# Patient Record
Sex: FEMALE | Race: WHITE | NOT HISPANIC OR LATINO | Employment: OTHER | URBAN - METROPOLITAN AREA
[De-identification: names, ages, dates, MRNs, and addresses within clinical notes are randomized per-mention and may not be internally consistent; named-entity substitution may affect disease eponyms.]

---

## 2017-12-03 ENCOUNTER — HOSPITAL ENCOUNTER (EMERGENCY)
Facility: OTHER | Age: 57
Discharge: HOME | End: 2017-12-03
Attending: EMERGENCY MEDICINE
Payer: MEDICARE

## 2017-12-03 VITALS
DIASTOLIC BLOOD PRESSURE: 88 MMHG | SYSTOLIC BLOOD PRESSURE: 141 MMHG | HEIGHT: 61 IN | RESPIRATION RATE: 16 BRPM | WEIGHT: 183.19 LBS | BODY MASS INDEX: 34.58 KG/M2 | OXYGEN SATURATION: 97 % | HEART RATE: 96 BPM | TEMPERATURE: 99 F

## 2017-12-03 DIAGNOSIS — R09.81 NASAL CONGESTION: Primary | ICD-10-CM

## 2017-12-03 PROCEDURE — 99283 EMERGENCY DEPT VISIT LOW MDM: CPT

## 2017-12-03 RX ORDER — PREDNISONE 10 MG/1
10 TABLET ORAL DAILY
Qty: 5 TABLET | Refills: 0 | Status: SHIPPED | OUTPATIENT
Start: 2017-12-03 | End: 2017-12-08

## 2017-12-03 NOTE — ED PROVIDER NOTES
"Encounter Date: 12/3/2017       History     Chief Complaint   Patient presents with    Sore Throat     pt states," Sore throat and cough since yesterday."    Cough     The history is provided by the patient.   Sore Throat   This is a new problem. The current episode started 2 days ago. The problem occurs constantly. The problem has not changed since onset.Pertinent negatives include no chest pain, no abdominal pain, no headaches and no shortness of breath. Nothing aggravates the symptoms. She has tried nothing for the symptoms.   Cough   Associated symptoms include sore throat. Pertinent negatives include no chest pain, no headaches and no shortness of breath.     Review of patient's allergies indicates:  No Known Allergies  No past medical history on file.  No past surgical history on file.  No family history on file.  Social History   Substance Use Topics    Smoking status: Never Smoker    Smokeless tobacco: Not on file    Alcohol use Not on file     Review of Systems   Constitutional: Negative.    HENT: Positive for sore throat.    Eyes: Negative.    Respiratory: Positive for cough. Negative for shortness of breath.    Cardiovascular: Negative.  Negative for chest pain.   Gastrointestinal: Negative.  Negative for abdominal pain.   Endocrine: Negative.    Genitourinary: Negative.    Musculoskeletal: Negative.    Skin: Negative.    Allergic/Immunologic: Negative.    Neurological: Negative.  Negative for headaches.   Hematological: Negative.    Psychiatric/Behavioral: Negative.    All other systems reviewed and are negative.      Physical Exam     Initial Vitals [12/03/17 1223]   BP Pulse Resp Temp SpO2   (!) 190/111 96 16 99.1 °F (37.3 °C) 97 %      MAP       137.33         Physical Exam    Nursing note and vitals reviewed.  Constitutional: Vital signs are normal. She appears well-developed. She is active and cooperative.   HENT:   Head: Normocephalic and atraumatic.   Right Ear: Tympanic membrane normal. "   Left Ear: Tympanic membrane normal.   Nose: Mucosal edema and rhinorrhea present.   Mouth/Throat: Uvula is midline, oropharynx is clear and moist and mucous membranes are normal.   Eyes: Conjunctivae, EOM and lids are normal. Pupils are equal, round, and reactive to light.   Neck: Trachea normal and full passive range of motion without pain. Neck supple. No thyroid mass present.   Cardiovascular: Normal rate, regular rhythm, S1 normal, S2 normal, normal heart sounds, intact distal pulses and normal pulses.   Abdominal: Soft. Normal appearance, normal aorta and bowel sounds are normal.   Musculoskeletal: Normal range of motion.   Lymphadenopathy:     She has no axillary adenopathy.   Neurological: She is alert and oriented to person, place, and time.   Skin: Skin is warm, dry and intact.   Psychiatric: She has a normal mood and affect. Her speech is normal and behavior is normal. Judgment and thought content normal. Cognition and memory are normal.         ED Course   Procedures  Labs Reviewed - No data to display                            ED Course      Clinical Impression:   The encounter diagnosis was Nasal congestion.                           Bharath Rivas MD  12/03/17 1122

## 2018-09-20 PROBLEM — R73.01 IMPAIRED FASTING BLOOD SUGAR: Status: ACTIVE | Noted: 2018-09-20

## 2018-09-20 PROBLEM — J20.9 ACUTE BRONCHITIS: Status: ACTIVE | Noted: 2018-09-20

## 2018-09-20 PROBLEM — Z12.39 BREAST CANCER SCREENING, HIGH RISK PATIENT: Status: ACTIVE | Noted: 2018-09-20

## 2018-09-20 PROBLEM — R19.7 DIARRHEA: Status: ACTIVE | Noted: 2018-09-20

## 2018-09-20 PROBLEM — Z91.89 AT HIGH RISK FOR BREAST CANCER: Status: ACTIVE | Noted: 2018-09-20

## 2018-09-20 PROBLEM — E66.3 OVERWEIGHT (BMI 25.0-29.9): Status: ACTIVE | Noted: 2018-09-20

## 2018-09-20 PROBLEM — Z91.89 COLON CANCER HIGH RISK: Status: ACTIVE | Noted: 2018-09-20

## 2018-09-20 PROBLEM — R05.9 COUGH: Status: ACTIVE | Noted: 2018-09-20

## 2018-09-27 ENCOUNTER — HOSPITAL ENCOUNTER (OUTPATIENT)
Dept: RADIOLOGY | Facility: HOSPITAL | Age: 58
Discharge: HOME | End: 2018-09-27
Attending: INTERNAL MEDICINE
Payer: MEDICARE

## 2018-09-27 VITALS — WEIGHT: 185 LBS | HEIGHT: 66 IN | BODY MASS INDEX: 29.73 KG/M2

## 2018-09-27 DIAGNOSIS — Z12.39 BREAST CANCER SCREENING, HIGH RISK PATIENT: ICD-10-CM

## 2018-09-27 PROCEDURE — 77067 SCR MAMMO BI INCL CAD: CPT | Mod: TC

## 2018-09-27 PROCEDURE — 77067 SCR MAMMO BI INCL CAD: CPT | Mod: 26,,, | Performed by: RADIOLOGY

## 2018-09-27 PROCEDURE — 77063 BREAST TOMOSYNTHESIS BI: CPT | Mod: 26,,, | Performed by: RADIOLOGY

## 2021-03-26 ENCOUNTER — HOSPITAL ENCOUNTER (OUTPATIENT)
Dept: RADIOLOGY | Facility: HOSPITAL | Age: 61
Discharge: HOME | End: 2021-03-26
Attending: INTERNAL MEDICINE
Payer: MEDICARE

## 2021-03-26 DIAGNOSIS — Z12.31 ENCOUNTER FOR MAMMOGRAM TO ESTABLISH BASELINE MAMMOGRAM: ICD-10-CM

## 2021-03-26 PROCEDURE — 77067 SCR MAMMO BI INCL CAD: CPT | Mod: 26,,, | Performed by: RADIOLOGY

## 2021-03-26 PROCEDURE — 77067 SCR MAMMO BI INCL CAD: CPT | Mod: TC

## 2021-03-26 PROCEDURE — 77067 MAMMO DIGITAL SCREENING BILAT: ICD-10-PCS | Mod: 26,,, | Performed by: RADIOLOGY

## 2022-03-31 ENCOUNTER — LAB VISIT (OUTPATIENT)
Dept: LAB | Facility: HOSPITAL | Age: 62
End: 2022-03-31
Attending: INTERNAL MEDICINE
Payer: MEDICARE

## 2022-03-31 ENCOUNTER — OFFICE VISIT (OUTPATIENT)
Dept: FAMILY MEDICINE | Facility: CLINIC | Age: 62
End: 2022-03-31
Payer: MEDICARE

## 2022-03-31 VITALS
WEIGHT: 185.19 LBS | BODY MASS INDEX: 29.76 KG/M2 | HEIGHT: 66 IN | SYSTOLIC BLOOD PRESSURE: 126 MMHG | OXYGEN SATURATION: 99 % | TEMPERATURE: 98 F | HEART RATE: 77 BPM | DIASTOLIC BLOOD PRESSURE: 76 MMHG

## 2022-03-31 DIAGNOSIS — Z00.00 PREVENTATIVE HEALTH CARE: ICD-10-CM

## 2022-03-31 DIAGNOSIS — Z00.00 PREVENTATIVE HEALTH CARE: Primary | ICD-10-CM

## 2022-03-31 DIAGNOSIS — G40.909 SEIZURE DISORDER: ICD-10-CM

## 2022-03-31 DIAGNOSIS — Z12.31 ENCOUNTER FOR SCREENING MAMMOGRAM FOR MALIGNANT NEOPLASM OF BREAST: ICD-10-CM

## 2022-03-31 DIAGNOSIS — Z11.59 NEED FOR HEPATITIS C SCREENING TEST: ICD-10-CM

## 2022-03-31 DIAGNOSIS — F79 INTELLECTUAL DISABILITY: ICD-10-CM

## 2022-03-31 LAB
ALBUMIN SERPL BCP-MCNC: 4.1 G/DL (ref 3.5–5.2)
ALP SERPL-CCNC: 126 U/L (ref 55–135)
ALT SERPL W/O P-5'-P-CCNC: 13 U/L (ref 10–44)
ANION GAP SERPL CALC-SCNC: 9 MMOL/L (ref 8–16)
AST SERPL-CCNC: 17 U/L (ref 10–40)
BASOPHILS # BLD AUTO: 0.05 K/UL (ref 0–0.2)
BASOPHILS NFR BLD: 0.6 % (ref 0–1.9)
BILIRUB SERPL-MCNC: 0.2 MG/DL (ref 0.1–1)
BUN SERPL-MCNC: 13 MG/DL (ref 8–23)
CALCIUM SERPL-MCNC: 9.3 MG/DL (ref 8.7–10.5)
CHLORIDE SERPL-SCNC: 103 MMOL/L (ref 95–110)
CO2 SERPL-SCNC: 29 MMOL/L (ref 23–29)
CREAT SERPL-MCNC: 0.7 MG/DL (ref 0.5–1.4)
DIFFERENTIAL METHOD: ABNORMAL
EOSINOPHIL # BLD AUTO: 0.2 K/UL (ref 0–0.5)
EOSINOPHIL NFR BLD: 2.5 % (ref 0–8)
ERYTHROCYTE [DISTWIDTH] IN BLOOD BY AUTOMATED COUNT: 12 % (ref 11.5–14.5)
EST. GFR  (AFRICAN AMERICAN): >60 ML/MIN/1.73 M^2
EST. GFR  (NON AFRICAN AMERICAN): >60 ML/MIN/1.73 M^2
GLUCOSE SERPL-MCNC: 86 MG/DL (ref 70–110)
HCT VFR BLD AUTO: 41 % (ref 37–48.5)
HGB BLD-MCNC: 13.4 G/DL (ref 12–16)
IMM GRANULOCYTES # BLD AUTO: 0.02 K/UL (ref 0–0.04)
IMM GRANULOCYTES NFR BLD AUTO: 0.2 % (ref 0–0.5)
LYMPHOCYTES # BLD AUTO: 3 K/UL (ref 1–4.8)
LYMPHOCYTES NFR BLD: 35.7 % (ref 18–48)
MCH RBC QN AUTO: 31.2 PG (ref 27–31)
MCHC RBC AUTO-ENTMCNC: 32.7 G/DL (ref 32–36)
MCV RBC AUTO: 95 FL (ref 82–98)
MONOCYTES # BLD AUTO: 1 K/UL (ref 0.3–1)
MONOCYTES NFR BLD: 11.7 % (ref 4–15)
NEUTROPHILS # BLD AUTO: 4.1 K/UL (ref 1.8–7.7)
NEUTROPHILS NFR BLD: 49.3 % (ref 38–73)
NRBC BLD-RTO: 0 /100 WBC
PLATELET # BLD AUTO: 280 K/UL (ref 150–450)
PMV BLD AUTO: 9.3 FL (ref 9.2–12.9)
POTASSIUM SERPL-SCNC: 4.4 MMOL/L (ref 3.5–5.1)
PROT SERPL-MCNC: 8 G/DL (ref 6–8.4)
RBC # BLD AUTO: 4.3 M/UL (ref 4–5.4)
SODIUM SERPL-SCNC: 141 MMOL/L (ref 136–145)
WBC # BLD AUTO: 8.4 K/UL (ref 3.9–12.7)

## 2022-03-31 PROCEDURE — 86803 HEPATITIS C AB TEST: CPT | Performed by: INTERNAL MEDICINE

## 2022-03-31 PROCEDURE — 99999 PR PBB SHADOW E&M-EST. PATIENT-LVL III: ICD-10-PCS | Mod: PBBFAC,,, | Performed by: INTERNAL MEDICINE

## 2022-03-31 PROCEDURE — 85025 COMPLETE CBC W/AUTO DIFF WBC: CPT | Performed by: INTERNAL MEDICINE

## 2022-03-31 PROCEDURE — 99204 OFFICE O/P NEW MOD 45 MIN: CPT | Mod: S$PBB,,, | Performed by: INTERNAL MEDICINE

## 2022-03-31 PROCEDURE — 99999 PR PBB SHADOW E&M-EST. PATIENT-LVL III: CPT | Mod: PBBFAC,,, | Performed by: INTERNAL MEDICINE

## 2022-03-31 PROCEDURE — 80053 COMPREHEN METABOLIC PANEL: CPT | Performed by: INTERNAL MEDICINE

## 2022-03-31 PROCEDURE — 36415 COLL VENOUS BLD VENIPUNCTURE: CPT | Performed by: INTERNAL MEDICINE

## 2022-03-31 PROCEDURE — 99204 PR OFFICE/OUTPT VISIT, NEW, LEVL IV, 45-59 MIN: ICD-10-PCS | Mod: S$PBB,,, | Performed by: INTERNAL MEDICINE

## 2022-03-31 PROCEDURE — 99213 OFFICE O/P EST LOW 20 MIN: CPT | Mod: PBBFAC,PN | Performed by: INTERNAL MEDICINE

## 2022-03-31 RX ORDER — CARBAMAZEPINE 200 MG/1
200 TABLET ORAL 3 TIMES DAILY
COMMUNITY
Start: 2022-02-10 | End: 2022-03-31 | Stop reason: SDUPTHER

## 2022-03-31 NOTE — PROGRESS NOTES
"Subjective:       Patient ID: Smita Coughlin is a 62 y.o. female.    Chief Complaint: Establish Care (New patient)    Est pcp    HPI: 61 y/o with mental retardation seizure disorder presents with her mother to establish primary care. Patient with limited verbal ability but able to follow up command. Her mother helps with all ADL's she is able to dress and feed herself. Mother assists with her antiseizure medication. She is under care of neurologist, Dr. Lawrence, for her seizures. Mother tells me taht patient has had a seizure in more than two years. Her seizures are typically manifested by stumbling or difficulty hold objects in her hands. 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 Mother reports she sleeps well does not note her being in any pain or discomfort    Review of Systems    Objective:     Vitals:    03/31/22 1451   BP: 126/76   BP Location: Right arm   Patient Position: Sitting   BP Method: Medium (Manual)   Pulse: 77   Temp: 98.2 °F (36.8 °C)   TempSrc: Oral   SpO2: 99%   Weight: 84 kg (185 lb 3 oz)   Height: 5' 6" (1.676 m)          Physical Exam  Constitutional:       Appearance: She is obese.      Comments: She is cooperative with exam    HENT:      Head: Normocephalic and atraumatic.   Eyes:      General: No scleral icterus.  Cardiovascular:      Rate and Rhythm: Normal rate and regular rhythm.      Pulses: Normal pulses.      Heart sounds: No murmur heard.    No friction rub. No gallop.   Pulmonary:      Effort: Pulmonary effort is normal. No respiratory distress.      Breath sounds: No wheezing.   Abdominal:      General: There is no distension.      Palpations: Abdomen is soft.   Musculoskeletal:      Right lower leg: No edema.      Left lower leg: No edema.   Skin:     General: Skin is warm and dry.   Neurological:      Mental Status: She is alert.         Assessment and Plan   1. Preventative health care  Taking medication with supervision of her " mother   Wear seatbelts  Daily activity  - CBC Auto Differential; Future  - Comprehensive Metabolic Panel; Future    2. Seizure disorder  Check blood count and lfts for adverse effects medicaiton management per her neurologist  - CBC Auto Differential; Future  - Comprehensive Metabolic Panel; Future    3. Mental retardation  Continue social support    4. Need for hepatitis C screening test  Hep c ab today  - Hepatitis C Antibody; Future    5. Encounter for screening mammogram for malignant neoplasm of breast  mammogram  - Mammo Digital Screening Bilat w/ Darvin; Future

## 2022-04-01 LAB — HCV AB SERPL QL IA: NEGATIVE

## 2022-04-07 DIAGNOSIS — Z12.11 COLON CANCER SCREENING: ICD-10-CM

## 2022-05-13 ENCOUNTER — HOSPITAL ENCOUNTER (OUTPATIENT)
Dept: RADIOLOGY | Facility: HOSPITAL | Age: 62
Discharge: HOME | End: 2022-05-13
Attending: INTERNAL MEDICINE
Payer: MEDICARE

## 2022-05-13 VITALS — BODY MASS INDEX: 29.76 KG/M2 | WEIGHT: 185.19 LBS | HEIGHT: 66 IN

## 2022-05-13 DIAGNOSIS — Z12.31 ENCOUNTER FOR SCREENING MAMMOGRAM FOR MALIGNANT NEOPLASM OF BREAST: ICD-10-CM

## 2022-05-13 PROCEDURE — 77063 MAMMO DIGITAL SCREENING BILAT WITH TOMO: ICD-10-PCS | Mod: 26,,, | Performed by: RADIOLOGY

## 2022-05-13 PROCEDURE — 77067 SCR MAMMO BI INCL CAD: CPT | Mod: 26,,, | Performed by: RADIOLOGY

## 2022-05-13 PROCEDURE — 77063 BREAST TOMOSYNTHESIS BI: CPT | Mod: 26,,, | Performed by: RADIOLOGY

## 2022-05-13 PROCEDURE — 77063 BREAST TOMOSYNTHESIS BI: CPT | Mod: TC

## 2022-05-13 PROCEDURE — 77067 MAMMO DIGITAL SCREENING BILAT WITH TOMO: ICD-10-PCS | Mod: 26,,, | Performed by: RADIOLOGY

## 2022-08-16 ENCOUNTER — TELEPHONE (OUTPATIENT)
Dept: FAMILY MEDICINE | Facility: CLINIC | Age: 62
End: 2022-08-16
Payer: MEDICARE

## 2022-08-16 NOTE — TELEPHONE ENCOUNTER
----- Message from Ernesto Jacobsen MD sent at 2022  1:48 PM CDT -----  RegardinL form  Please contact patient's mother to  completed 90L form in my outbox, please make copy to scan to patient's medical record as well. Thank you

## 2023-05-19 ENCOUNTER — HOSPITAL ENCOUNTER (EMERGENCY)
Facility: HOSPITAL | Age: 63
Discharge: HOME | End: 2023-05-20
Attending: STUDENT IN AN ORGANIZED HEALTH CARE EDUCATION/TRAINING PROGRAM
Payer: MEDICARE
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
## 2023-05-19 DIAGNOSIS — L03.119 CELLULITIS OF HAND: Primary | ICD-10-CM

## 2023-05-19 PROCEDURE — 25000003 PHARM REV CODE 250: Performed by: STUDENT IN AN ORGANIZED HEALTH CARE EDUCATION/TRAINING PROGRAM

## 2023-05-19 PROCEDURE — 99284 EMERGENCY DEPT VISIT MOD MDM: CPT

## 2023-05-19 RX ORDER — ACETAMINOPHEN 500 MG
1000 TABLET ORAL
Status: COMPLETED | OUTPATIENT
Start: 2023-05-19 | End: 2023-05-19

## 2023-05-19 RX ORDER — AMOXICILLIN AND CLAVULANATE POTASSIUM 875; 125 MG/1; MG/1
1 TABLET, FILM COATED ORAL
Status: COMPLETED | OUTPATIENT
Start: 2023-05-19 | End: 2023-05-19

## 2023-05-19 RX ORDER — IBUPROFEN 600 MG/1
600 TABLET ORAL
Status: COMPLETED | OUTPATIENT
Start: 2023-05-19 | End: 2023-05-19

## 2023-05-19 RX ADMIN — TETANUS TOXOID, REDUCED DIPHTHERIA TOXOID AND ACELLULAR PERTUSSIS VACCINE, ADSORBED 0.5 ML: 5; 2.5; 8; 8; 2.5 SUSPENSION INTRAMUSCULAR at 11:05

## 2023-05-19 RX ADMIN — IBUPROFEN 600 MG: 600 TABLET ORAL at 11:05

## 2023-05-19 RX ADMIN — AMOXICILLIN AND CLAVULANATE POTASSIUM 1 TABLET: 875; 125 TABLET, FILM COATED ORAL at 11:05

## 2023-05-19 RX ADMIN — ACETAMINOPHEN 1000 MG: 500 TABLET ORAL at 11:05

## 2023-05-20 VITALS
OXYGEN SATURATION: 96 % | HEART RATE: 69 BPM | SYSTOLIC BLOOD PRESSURE: 128 MMHG | TEMPERATURE: 98 F | RESPIRATION RATE: 16 BRPM | DIASTOLIC BLOOD PRESSURE: 74 MMHG | WEIGHT: 185 LBS | BODY MASS INDEX: 29.86 KG/M2

## 2023-05-20 PROCEDURE — 90471 IMMUNIZATION ADMIN: CPT | Performed by: STUDENT IN AN ORGANIZED HEALTH CARE EDUCATION/TRAINING PROGRAM

## 2023-05-20 PROCEDURE — 63600175 PHARM REV CODE 636 W HCPCS: Performed by: STUDENT IN AN ORGANIZED HEALTH CARE EDUCATION/TRAINING PROGRAM

## 2023-05-20 PROCEDURE — 90715 TDAP VACCINE 7 YRS/> IM: CPT | Performed by: STUDENT IN AN ORGANIZED HEALTH CARE EDUCATION/TRAINING PROGRAM

## 2023-05-20 RX ORDER — AMOXICILLIN AND CLAVULANATE POTASSIUM 875; 125 MG/1; MG/1
1 TABLET, FILM COATED ORAL 2 TIMES DAILY
Qty: 20 TABLET | Refills: 0 | Status: SHIPPED | OUTPATIENT
Start: 2023-05-20 | End: 2023-05-30

## 2023-05-20 RX ORDER — NAPROXEN 500 MG/1
500 TABLET ORAL 2 TIMES DAILY WITH MEALS
Qty: 60 TABLET | Refills: 0 | Status: SHIPPED | OUTPATIENT
Start: 2023-05-20

## 2023-05-20 RX ORDER — NAPROXEN 500 MG/1
500 TABLET ORAL 2 TIMES DAILY WITH MEALS
Qty: 60 TABLET | Refills: 0 | Status: SHIPPED | OUTPATIENT
Start: 2023-05-20 | End: 2023-05-20 | Stop reason: SDUPTHER

## 2023-05-20 RX ORDER — AMOXICILLIN AND CLAVULANATE POTASSIUM 875; 125 MG/1; MG/1
1 TABLET, FILM COATED ORAL 2 TIMES DAILY
Qty: 20 TABLET | Refills: 0 | Status: SHIPPED | OUTPATIENT
Start: 2023-05-20 | End: 2023-05-20 | Stop reason: SDUPTHER

## 2023-05-20 NOTE — DISCHARGE INSTRUCTIONS
Your hand needs to be re-evaluated on Monday.  You can follow-up with your primary care doctor.  If you are unable to get an appointment please return to the ER for re-evaluation.  Take medication as prescribed.  I have also referred you to hand surgery          Thank you for coming to our Emergency Department today. It is important to remember that some problems or medical conditions are difficult to diagnose and may not be found or addressed during your Emergency Department visit.  These conditions often start with non-specific symptoms and can only be diagnosed on follow up visits with your primary care physician or specialist when the symptoms continue or change. Please remember that all medical conditions can change, and we cannot predict how you will be feeling tomorrow or the next day.    Return to the ER with any questions/concerns, new/concerning symptoms, worsening, failure to improve or inability to obtain follow-up.       Be sure to follow up with your primary care doctor and review all labs/imaging/tests that were performed during your ER visit with them. It is very common for us to identify non-emergent incidental findings which must be followed up with your primary care physician.  Some labs/imaging/tests may be outside of the normal range, and require non-emergent follow-up and/or further investigation/treatment/procedures/testing to help diagnose/exclude/prevent complications or other potentially serious medical conditions. Some abnormalities may not have been discussed or addressed during your ER visit. Some lab results may not return during your ER visit but can be accessible by downloading the free [facility] bereket or by visiting [email or web address] . It is important for you to review all labs/imaging/tests which are outside of the normal range with your physician.    An ER visit does not replace a primary care visit, and many screening tests or follow-up tests cannot be ordered by an ER  doctor or performed by the ER. Some tests may even require pre-approval.    If you do not have a primary care doctor, you may contact the one listed on your discharge paperwork or you may also call the [facility] Appointment Desk at [phone] , or [address] [place] [address] at  [phone] to schedule an appointment, or establish care with a primary care doctor or even a specialist and to obtain information about local resources. It is important to your health that you have a primary care doctor.    Please take all medications as directed. We have done our best to select a medication for you that will treat your condition however, all medications may potentially have side-effects and it is impossible to predict which medications may give you side-effects or what those side-effects (if any) those medications may give you.  If you feel that you are having a negative effect or side-effect of any medication you should stop taking those medications immediately and seek medical attention. If you feel that you are having a life-threatening reaction call 911.        Do not drive, swim, climb to height, take a bath, operate heavy machinery, drink alcohol or take potentially sedating medications, sign any legal documents or make any important decisions for 24 hours if you have received any pain medications, sedatives or mood altering drugs during your ER visit or within 24 hours of taking them if they have been prescribed to you.     You can find additional resources for Dentists, hearing aids, durable medical equipment, low cost pharmacies and other resources at [email or web address]

## 2023-05-20 NOTE — ED PROVIDER NOTES
Encounter Date: 5/19/2023    SCRIBE #1 NOTE: I, Mckenzie Rasheed, am scribing for, and in the presence of,  Jose Guadalupe Holm MD. I have scribed the entire note.     History     Chief Complaint   Patient presents with    Arm Swelling     Pt presents to the ED with c/o left arm swelling after being bitten by family dog last night. Denies fever/chills or pain     Time patient was seen by the provider: 11:34 PM      The patient is a 63 y.o. female with past medical history of seizure disorder, intellectual disability who presents to the ED with a complaint of injury to the left arm due to a dog bite sustained 1 day ago. Additional history obtained by independent historian: Mother. The patient's mother reports that the patient was bitten by their pineda retriever last night, but it only bled a little and was not swollen. She noticed the swelling of the patient's arm tonight when she grabbed her arm. The patient's mother states she applied neosporin and ice to the left wrist to alleviate the symptoms. Associated symptoms include pain and swelling of the left hand and wrist. No other exacerbating or alleviating factors. Patient denies any other associated symptoms.     The history is provided by the patient and a parent. The history is limited by a developmental delay. No  was used.   Review of patient's allergies indicates:  No Known Allergies  Past Medical History:   Diagnosis Date    Anxiety 12/8/2012    Mental retardation 12/8/2012    Seizure disorder 12/8/2012     Past Surgical History:   Procedure Laterality Date    lap bela       Family History   Problem Relation Age of Onset    No Known Problems Mother         Afib    No Known Problems Father         CAD    No Known Problems Brother      Social History     Tobacco Use    Smoking status: Never    Smokeless tobacco: Never     Review of Systems   Constitutional:  Negative for chills and fever.   HENT:  Negative for facial swelling and sore  throat.    Eyes:  Negative for visual disturbance.   Respiratory:  Negative for cough and shortness of breath.    Cardiovascular:  Negative for chest pain and palpitations.   Gastrointestinal:  Negative for abdominal pain, nausea and vomiting.   Genitourinary:  Negative for dysuria and hematuria.   Musculoskeletal:  Positive for arthralgias, joint swelling (wrist) and myalgias. Negative for back pain.   Skin:  Positive for wound. Negative for rash.   Neurological:  Negative for weakness and headaches.   Hematological:  Does not bruise/bleed easily.   Psychiatric/Behavioral: Negative.       Physical Exam     Initial Vitals [05/19/23 2229]   BP Pulse Resp Temp SpO2   (!) 241/121 89 16 97.6 °F (36.4 °C) 99 %      MAP       --         Physical Exam    Nursing note and vitals reviewed.  Constitutional: She appears well-developed and well-nourished. She is not diaphoretic. No distress.   HENT:   Head: Normocephalic and atraumatic.   Right Ear: External ear normal.   Left Ear: External ear normal.   Nose: Nose normal.   Eyes: Conjunctivae are normal. No scleral icterus.   Neck: Neck supple. No tracheal deviation present.   Normal range of motion.  Cardiovascular:  Normal rate, regular rhythm and normal heart sounds.           Pulses:       Radial pulses are 2+ on the right side and 2+ on the left side.   Pulmonary/Chest: Breath sounds normal. No respiratory distress.   Abdominal: Abdomen is soft. Bowel sounds are normal. There is no abdominal tenderness.   Musculoskeletal:      Cervical back: Normal range of motion and neck supple.      Comments: Small puncture wound that is scabbed over with erythema and edema to ventral aspect of left wrist.   Erythema and swelling to the dorsal left palm.  Able to make a fist.        Neurological: She is alert and oriented to person, place, and time.   Intellectual disability.    Skin: Skin is warm and dry.   Psychiatric: She has a normal mood and affect. Thought content normal.        ED Course   Procedures  Labs Reviewed - No data to display       Imaging Results              X-Ray Hand 2 View Left (Final result)  Result time 05/20/23 00:02:47      Final result by Jonathan Duenas MD (05/20/23 00:02:47)                   Impression:      No acute osseous abnormality identified.      Electronically signed by: Jonathan Duenas MD  Date:    05/20/2023  Time:    00:02               Narrative:    EXAMINATION:  XR WRIST COMPLETE 3 VIEWS LEFT; XR HAND 2 VIEW LEFT    CLINICAL HISTORY:  Bitten by dog, initial encounter    TECHNIQUE:  PA, lateral, and oblique views of the left wrist were performed.  Left hand three views.    COMPARISON:  None    FINDINGS:  No evidence of acute displaced fracture, dislocation, or osseous destructive process.  Monitoring device is visualized over the index finger which partially obscures evaluation.  Soft tissue swelling is seen involving the wrist and over the dorsal aspect of the hand.  No radiopaque retained foreign body seen.  There is mild negative ulnar variance.                                       X-Ray Wrist Complete Left (Final result)  Result time 05/20/23 00:02:47      Final result by Jonathan Duenas MD (05/20/23 00:02:47)                   Impression:      No acute osseous abnormality identified.      Electronically signed by: Jonathan Duenas MD  Date:    05/20/2023  Time:    00:02               Narrative:    EXAMINATION:  XR WRIST COMPLETE 3 VIEWS LEFT; XR HAND 2 VIEW LEFT    CLINICAL HISTORY:  Bitten by dog, initial encounter    TECHNIQUE:  PA, lateral, and oblique views of the left wrist were performed.  Left hand three views.    COMPARISON:  None    FINDINGS:  No evidence of acute displaced fracture, dislocation, or osseous destructive process.  Monitoring device is visualized over the index finger which partially obscures evaluation.  Soft tissue swelling is seen involving the wrist and over the dorsal aspect of the hand.  No radiopaque retained  foreign body seen.  There is mild negative ulnar variance.                                       Medications   Tdap (BOOSTRIX) vaccine injection 0.5 mL (0.5 mLs Intramuscular Given 5/19/23 2332)   ibuprofen tablet 600 mg (600 mg Oral Given 5/19/23 2332)   acetaminophen tablet 1,000 mg (1,000 mg Oral Given 5/19/23 2332)   amoxicillin-clavulanate 875-125mg per tablet 1 tablet (1 tablet Oral Given 5/19/23 2332)     Medical Decision Making:   History:   Old Medical Records: I decided to obtain old medical records.  Clinical Tests:   Radiological Study: Ordered and Reviewed        Scribe Attestation:   Scribe #1: I performed the above scribed service and the documentation accurately describes the services I performed. I attest to the accuracy of the note.    Attending Attestation:           Physician Attestation for Scribe:  Physician Attestation Statement for Scribe #1: I, reviewed documentation, as scribed by Mckenzie Rasheed in my presence, and it is both accurate and complete.           ED Course as of 05/20/23 0304   Sat May 20, 2023   0024 X-Ray Hand 2 View Left  Impression:     No acute osseous abnormality identified. [CC]   0024 X-Ray Wrist Complete Left  Impression:     No acute osseous abnormality identified. [CC]   0259 Patient presenting to the emergency department for evaluation of hand swelling after dog bite yesterday.  It was a pineda retriever.  The dog has had all the shots up-to-date.  I do not believe rabies immunization indicated at this time.  Concern for possible infection versus edema related to injury.  Patient without fever.  Vital signs are stable.  She otherwise looks well at bedside.  Plan to treat with Augmentin and have her re-evaluated in 2 days by PCP and or in the ER.  Patient does not have any high-risk past medical history including heart disease, peripheral arterial disease, diabetes.  I believe it is appropriate for outpatient treatment initially.  X-rays are negative without any  indication of gas formation.  No fluctuance noted on exam.  Doubt abscess.  No fractures noted on x-ray.  I reviewed all x-rays personally.  Strict return precautions given. I discussed with the patient/family the diagnosis, treatment plan, indications for return to the emergency department, and for expected follow-up. The patient/family verbalized an understanding. The patient/family is asked if there are any questions or concerns. We discuss the case, until all issues are addressed to the patient/family's satisfaction. Patient/family understands and is agreeable to the plan. Patient is stable and ready for discharge.      [CC]      ED Course User Index  [CC] Jose Guadalupe Holm MD                   Clinical Impression:   Final diagnoses:  [W54.0XXA] Dog bite  [L03.119] Cellulitis of hand (Primary)        ED Disposition Condition    Discharge Stable          ED Prescriptions       Medication Sig Dispense Start Date End Date Auth. Provider    naproxen (NAPROSYN) 500 MG tablet  (Status: Discontinued) Take 1 tablet (500 mg total) by mouth 2 (two) times daily with meals. 60 tablet 5/20/2023 5/20/2023 Jose Guadalupe Holm MD    amoxicillin-clavulanate 875-125mg (AUGMENTIN) 875-125 mg per tablet  (Status: Discontinued) Take 1 tablet by mouth 2 (two) times daily. for 10 days 20 tablet 5/20/2023 5/20/2023 Jose Guadalupe Holm MD    amoxicillin-clavulanate 875-125mg (AUGMENTIN) 875-125 mg per tablet Take 1 tablet by mouth 2 (two) times daily. for 10 days 20 tablet 5/20/2023 5/30/2023 Jose Guadalupe Holm MD    naproxen (NAPROSYN) 500 MG tablet Take 1 tablet (500 mg total) by mouth 2 (two) times daily with meals. 60 tablet 5/20/2023 -- Jose Guadalupe Holm MD          Follow-up Information       Follow up With Specialties Details Why Contact Info    Ernesto Jacobsen MD Internal Medicine, Wound Care Schedule an appointment as soon as possible for a visit in 2 days  [address] [facility]  [place] [address]  [phone]      [facility]  Emergency Dept Emergency Medicine Go to  If symptoms worsen [address]  [facility] [id number] [phone]             Jose Guadalupe Holm MD  05/20/23 6069

## 2023-05-20 NOTE — ED TRIAGE NOTES
Pt BIB mother with c/o bite injury yesterday at 1500. Pt presents with dog bite wound to L hand, swelling and redness noted. Pt denies pain, fever, or chills. Tegretol given PTA. Pt is intellectually delayed, NAD, no trauma or bleeding noted.

## 2023-05-22 ENCOUNTER — TELEPHONE (OUTPATIENT)
Dept: FAMILY MEDICINE | Facility: CLINIC | Age: 63
End: 2023-05-22
Payer: MEDICARE

## 2023-05-22 NOTE — TELEPHONE ENCOUNTER
----- Message from Brigida Villalpando sent at 5/22/2023  1:29 PM CDT -----  Type: Patient Call Back    Who called:Mrs. Coughlin/ Mother     What is the request in detail: Asking for a call regarding PT     Can the clinic reply by [facility]? No     Would the patient rather a call back or a response via [facility]? CALL     Best call back number: [phone]

## 2023-05-22 NOTE — TELEPHONE ENCOUNTER
Assisted patient's mother to get patient scheduled for an appt, advise that she refer to ER again if her hand swelling doesn't improve or if she develops a fever, she verbalized understanding.

## 2023-05-23 ENCOUNTER — TELEPHONE (OUTPATIENT)
Dept: FAMILY MEDICINE | Facility: CLINIC | Age: 63
End: 2023-05-23
Payer: MEDICARE

## 2023-05-23 ENCOUNTER — PES CALL (OUTPATIENT)
Dept: ADMINISTRATIVE | Facility: CLINIC | Age: 63
End: 2023-05-23
Payer: MEDICARE

## 2023-05-23 DIAGNOSIS — Z12.31 ENCOUNTER FOR SCREENING MAMMOGRAM FOR MALIGNANT NEOPLASM OF BREAST: Primary | ICD-10-CM

## 2023-05-23 NOTE — TELEPHONE ENCOUNTER
----- Message from Loly Lugo sent at 5/23/2023 10:02 AM CDT -----  Type:  Mammogram    Caller is requesting to schedule their annual mammogram appointment.  Order is not listed in EPIC.  Please enter order and contact patient to schedule.  Name of Caller: mother     Where would they like the mammogram performed? [facility] [place]    Would the patient rather a call back or a response via [facility]? call    Best Call Back Number: [phone] (home)

## 2023-05-30 ENCOUNTER — HOSPITAL ENCOUNTER (OUTPATIENT)
Dept: RADIOLOGY | Facility: HOSPITAL | Age: 63
Discharge: HOME | End: 2023-05-30
Attending: INTERNAL MEDICINE
Payer: MEDICARE

## 2023-05-30 ENCOUNTER — OFFICE VISIT (OUTPATIENT)
Dept: FAMILY MEDICINE | Facility: CLINIC | Age: 63
End: 2023-05-30
Payer: MEDICARE

## 2023-05-30 VITALS
TEMPERATURE: 98 F | HEIGHT: 66 IN | OXYGEN SATURATION: 98 % | DIASTOLIC BLOOD PRESSURE: 78 MMHG | WEIGHT: 179.69 LBS | SYSTOLIC BLOOD PRESSURE: 134 MMHG | HEART RATE: 64 BPM | BODY MASS INDEX: 28.88 KG/M2

## 2023-05-30 VITALS — HEIGHT: 66 IN | BODY MASS INDEX: 29.72 KG/M2 | WEIGHT: 184.94 LBS

## 2023-05-30 DIAGNOSIS — Z12.31 ENCOUNTER FOR SCREENING MAMMOGRAM FOR MALIGNANT NEOPLASM OF BREAST: ICD-10-CM

## 2023-05-30 DIAGNOSIS — G40.909 SEIZURE DISORDER: ICD-10-CM

## 2023-05-30 DIAGNOSIS — Z00.00 PREVENTATIVE HEALTH CARE: Primary | ICD-10-CM

## 2023-05-30 DIAGNOSIS — F79 INTELLECTUAL DISABILITY: ICD-10-CM

## 2023-05-30 PROCEDURE — 99213 OFFICE O/P EST LOW 20 MIN: CPT | Mod: S$PBB,,, | Performed by: INTERNAL MEDICINE

## 2023-05-30 PROCEDURE — 99999 PR PBB SHADOW E&M-EST. PATIENT-LVL III: CPT | Mod: PBBFAC,,, | Performed by: INTERNAL MEDICINE

## 2023-05-30 PROCEDURE — 99213 OFFICE O/P EST LOW 20 MIN: CPT | Mod: PBBFAC,PN | Performed by: INTERNAL MEDICINE

## 2023-05-30 PROCEDURE — 77067 SCR MAMMO BI INCL CAD: CPT | Mod: 26,,, | Performed by: RADIOLOGY

## 2023-05-30 PROCEDURE — 77063 MAMMO DIGITAL SCREENING BILAT WITH TOMO: ICD-10-PCS | Mod: 26,,, | Performed by: RADIOLOGY

## 2023-05-30 PROCEDURE — 77067 SCR MAMMO BI INCL CAD: CPT | Mod: TC

## 2023-05-30 PROCEDURE — 77063 BREAST TOMOSYNTHESIS BI: CPT | Mod: 26,,, | Performed by: RADIOLOGY

## 2023-05-30 PROCEDURE — 77067 MAMMO DIGITAL SCREENING BILAT WITH TOMO: ICD-10-PCS | Mod: 26,,, | Performed by: RADIOLOGY

## 2023-05-30 PROCEDURE — 99999 PR PBB SHADOW E&M-EST. PATIENT-LVL III: ICD-10-PCS | Mod: PBBFAC,,, | Performed by: INTERNAL MEDICINE

## 2023-05-30 PROCEDURE — 99213 PR OFFICE/OUTPT VISIT, EST, LEVL III, 20-29 MIN: ICD-10-PCS | Mod: S$PBB,,, | Performed by: INTERNAL MEDICINE

## 2023-05-30 NOTE — PROGRESS NOTES
"Subjective:       Patient ID: Smita Coughlin is a 63 y.o. female.    Chief Complaint: Follow-up (ER, left hand dog bite, pain scale 0)    Well exam    HPIP: 64 y/o with mental retardation seizure disorder presents with her mother, her primary care giver, for well exma two weeks ago suffered bite to left hand seen in ED wound flushed/cleansed and prescribed augmentin no further swelling or redness she has full use of hand without limitation or pain. Seh will be attendign a camp for 8 weeks this summer (mother will bring forms to be completed) no seizure in the last year taking medicaitons as prescribed by neurologist. Recent outside labs and neurology notes reviewed     Follow-up    Review of Systems   Unable to perform ROS: Patient nonverbal     Objective:     Vitals:    05/30/23 0818 05/30/23 0826   BP: (!) 148/84 134/78   BP Location: Left arm    Patient Position: Sitting    BP Method: Medium (Manual)    Pulse: 62 64   Temp: 97.6 °F (36.4 °C)    TempSrc: Oral    SpO2: 98%    Weight: 81.5 kg (179 lb 10.8 oz)    Height: 5' 6" (1.676 m)           Physical Exam  Constitutional:       Comments: Feels is cooperative with exam and follows direct simple two steps commands   Cardiovascular:      Rate and Rhythm: Normal rate and regular rhythm.      Pulses: Normal pulses.   Pulmonary:      Effort: Pulmonary effort is normal. No respiratory distress.      Breath sounds: No wheezing.   Abdominal:      General: There is no distension.      Palpations: Abdomen is soft.      Tenderness: There is no rebound.   Musculoskeletal:      Right lower leg: No edema.      Left lower leg: No edema.   Neurological:      Mental Status: She is alert.       Assessment and Plan   1. Preventative health care  Wear seatbelts at all times    Don't drink and drive    Wear bike helmet and other personal protective equipment when appropriate            2. Seizure disorder  Stable continue management per neurologist    3. Mental retardation  She does " require moderate assistance with adls (medicaiton planning, cooking) she is independent in dressing and other self care

## 2023-06-01 ENCOUNTER — PES CALL (OUTPATIENT)
Dept: ADMINISTRATIVE | Facility: CLINIC | Age: 63
End: 2023-06-01
Payer: MEDICARE

## 2023-07-17 ENCOUNTER — PES CALL (OUTPATIENT)
Dept: ADMINISTRATIVE | Facility: CLINIC | Age: 63
End: 2023-07-17
Payer: MEDICARE

## 2023-08-23 ENCOUNTER — TELEPHONE (OUTPATIENT)
Dept: FAMILY MEDICINE | Facility: CLINIC | Age: 63
End: 2023-08-23
Payer: MEDICARE

## 2023-08-23 NOTE — TELEPHONE ENCOUNTER
Called pt mother isa and explained [employer]L wasn't at  at the time due to staff was informed she'd come tomorrow . Clinic was on meal break /meeting , so forms were placed around after the time she left . Isa said she did say tomorrow but was in the area at the time an saw a opportunity to get them then . Will just get at 8/29 apt .

## 2023-08-23 NOTE — TELEPHONE ENCOUNTER
Called Patient's Mother to  90L form.  No answer.  Left voice message on an unidentified recorder requesting a call back.

## 2023-08-23 NOTE — TELEPHONE ENCOUNTER
----- Message from Ernesto Jacobsen MD sent at 2023  4:44 PM CDT -----  RegardinL form  Please contact patient's mother to  90-L form in my outbox please scan copy of form to patient's medical record thank you

## 2023-11-14 ENCOUNTER — TELEPHONE (OUTPATIENT)
Dept: FAMILY MEDICINE | Facility: CLINIC | Age: 63
End: 2023-11-14
Payer: MEDICARE

## 2023-11-14 NOTE — PROGRESS NOTES
Subjective:       Patient ID: Smita Coughlin is a pleasant 63 y.o. White female patient    Chief Complaint: Cough      Patient is a new pt to me but is established pt from Dr. Jacobsen. Last visit with him on 05/30/2023, see my last notes.     HPI    Pt with past medical history as per list of problems below coming today due issue of cough.  Started 2 days ago.  She is with her mom today due to her issue of mental retardation.  She herself has no complaint except once in a while.    Patient is pleasant, smiling, but can not tell so much about her symptoms.    Her mom reports that the patient was in contact with two persons who had the flu on Thursday.    On Saturday, they also went to a wedding, somebody was coughing a lot close to them with no mask.  The mom gave the patient OTC medications for cough that seems to help somewhat.  Patient is stated not have shortness of breath, wheezing, she does not have productive cough.  Her mom does not report fever.     Patient Active Problem List   Diagnosis    Mental retardation    Anxiety    Seizure disorder    At high risk for breast cancer    Diarrhea    Colon cancer high risk    Cough    Acute bronchitis    Overweight (BMI 25.0-29.9)    Impaired fasting blood sugar    Breast cancer screening, high risk patient          ACTIVE MEDICAL ISSUES:  Documented in Problem List     PAST MEDICAL HISTORY  Documented     PAST SURGICAL HISTORY:  Documented     SOCIAL HISTORY:  Documented     FAMILY HISTORY:  Documented     ALLERGIES AND MEDICATIONS: updated and reviewed.  Documented    Review of Systems   Reason unable to perform ROS: pt with mental retardation.   Respiratory:  Positive for cough (as per mother).        Objective:      Physical Exam  Vitals and nursing note reviewed.   Constitutional:       Appearance: Normal appearance.      Comments: Pt is cooperative with exam and follows direct simple two steps commands   Cardiovascular:      Rate and Rhythm: Normal rate and regular  "rhythm.      Pulses: Normal pulses.   Pulmonary:      Effort: Pulmonary effort is normal. No respiratory distress.      Breath sounds: Normal breath sounds. No wheezing.      Comments: Occasional cough that is dry  Abdominal:      General: There is no distension.      Palpations: Abdomen is soft.      Tenderness: There is no rebound.   Musculoskeletal:      Right lower leg: No edema.      Left lower leg: No edema.   Neurological:      Mental Status: She is alert. Mental status is at baseline.         Vitals:    11/15/23 0753   BP: (!) 142/80   BP Location: Right arm   Patient Position: Sitting   BP Method: Medium (Manual)   Pulse: 84   Resp: 16   Temp: 99.9 °F (37.7 °C)   TempSrc: Oral   SpO2: (!) 94%   Weight: 79.9 kg (176 lb 2.4 oz)   Height: 5' 6" (1.676 m)     Body mass index is 28.43 kg/m².    RESULTS: Reviewed labs from last 12 months    Last Lab Results:     Lab Results   Component Value Date    WBC 8.40 03/31/2022    HGB 13.4 03/31/2022    HCT 41.0 03/31/2022     03/31/2022     03/31/2022    K 4.4 03/31/2022     03/31/2022    CO2 29 03/31/2022    BUN 13 03/31/2022    CREATININE 0.7 03/31/2022    CALCIUM 9.3 03/31/2022    ALBUMIN 4.1 03/31/2022    AST 17 03/31/2022    ALT 13 03/31/2022    CHOL 185 09/25/2018    TRIG 174 (H) 09/25/2018    HDL 44 09/25/2018    LDLCALC 106 (H) 09/25/2018    HGBA1C 5.4 09/25/2018     Assessment:       1. Suspected COVID-19 virus infection    2. Cough, unspecified type    3. Fever, unspecified fever cause    4. Influenza A        Plan:   Smita was seen today for cough.    Diagnoses and all orders for this visit:    Suspected COVID-19 virus infection  -     COVID-19 Routine Screening    See HPI and PE.  Wanted to rule out COVID, test still pending, for now patient quarantined, however was found to have flu.      Cough, unspecified type  -     POCT Influenza A/B  -     promethazine-dextromethorphan (PROMETHAZINE-DM) 6.25-15 mg/5 mL Syrp; Take 5 mLs by mouth every " 12 (twelve) hours as needed (cough). Careful to sleepiness    See above, will treat to with cough syrup.    Fever, unspecified fever cause  -     POCT Influenza A/B    See above.    Influenza A  -     oseltamivir (TAMIFLU) 75 MG capsule; Take 1 capsule (75 mg total) by mouth 2 (two) times daily. for 5 days  -     promethazine-dextromethorphan (PROMETHAZINE-DM) 6.25-15 mg/5 mL Syrp; Take 5 mLs by mouth every 12 (twelve) hours as needed (cough). Careful to sleepiness    POC flu test came back positive. I contacted pt' s mom with this result. I will treat the pt with Tamiflu. Discussed symptoms and signs to monitor. Will not be able to go to school for now.    No follow-ups on file.    This note was created by combination of typed  and M-Modal dictation.  Transcription errors may be present.  If there are any questions, please contact me.

## 2023-11-14 NOTE — TELEPHONE ENCOUNTER
Rt pt mother Gail, symptoms started yesterday with cough . Cold/flu symptoms , no fever . Didn't test for covid .Offered first opening tomorrow 8 am at [facility] and she accept

## 2023-11-14 NOTE — TELEPHONE ENCOUNTER
----- Message from Surinder Wilkes sent at 11/14/2023 12:12 PM CST -----  Type: Patient Call Back    Who called:Mom    What is the request in detail:Pt isn't feeling well, temp and pressure is okay. Mom wants to know what should be done    Can the clinic reply by [facility]?No    Would the patient rather a call back or a response via [facility]? Call    Best call back number:[phone]    Additional Information:

## 2023-11-15 ENCOUNTER — OFFICE VISIT (OUTPATIENT)
Dept: FAMILY MEDICINE | Facility: CLINIC | Age: 63
End: 2023-11-15
Payer: MEDICARE

## 2023-11-15 ENCOUNTER — TELEPHONE (OUTPATIENT)
Dept: FAMILY MEDICINE | Facility: CLINIC | Age: 63
End: 2023-11-15
Payer: MEDICARE

## 2023-11-15 VITALS
RESPIRATION RATE: 16 BRPM | BODY MASS INDEX: 28.31 KG/M2 | SYSTOLIC BLOOD PRESSURE: 142 MMHG | HEART RATE: 84 BPM | WEIGHT: 176.13 LBS | OXYGEN SATURATION: 94 % | HEIGHT: 66 IN | DIASTOLIC BLOOD PRESSURE: 80 MMHG | TEMPERATURE: 100 F

## 2023-11-15 DIAGNOSIS — R05.9 COUGH, UNSPECIFIED TYPE: ICD-10-CM

## 2023-11-15 DIAGNOSIS — J10.1 INFLUENZA A: ICD-10-CM

## 2023-11-15 DIAGNOSIS — R50.9 FEVER, UNSPECIFIED FEVER CAUSE: ICD-10-CM

## 2023-11-15 DIAGNOSIS — Z20.822 SUSPECTED COVID-19 VIRUS INFECTION: Primary | ICD-10-CM

## 2023-11-15 LAB
CTP QC/QA: YES
FLUAV AG NPH QL: POSITIVE
FLUBV AG NPH QL: NEGATIVE
SARS-COV-2 RNA RESP QL NAA+PROBE: NOT DETECTED

## 2023-11-15 PROCEDURE — 99999 PR PBB SHADOW E&M-EST. PATIENT-LVL III: CPT | Mod: PBBFAC,,, | Performed by: INTERNAL MEDICINE

## 2023-11-15 PROCEDURE — 87635 SARS-COV-2 COVID-19 AMP PRB: CPT | Performed by: INTERNAL MEDICINE

## 2023-11-15 PROCEDURE — 99999 PR PBB SHADOW E&M-EST. PATIENT-LVL III: ICD-10-PCS | Mod: PBBFAC,,, | Performed by: INTERNAL MEDICINE

## 2023-11-15 PROCEDURE — 99999PBSHW POCT INFLUENZA A/B: Mod: 59,PBBFAC,,

## 2023-11-15 PROCEDURE — 99213 OFFICE O/P EST LOW 20 MIN: CPT | Mod: PBBFAC,PO | Performed by: INTERNAL MEDICINE

## 2023-11-15 PROCEDURE — 99214 OFFICE O/P EST MOD 30 MIN: CPT | Mod: S$PBB,,, | Performed by: INTERNAL MEDICINE

## 2023-11-15 PROCEDURE — 99214 PR OFFICE/OUTPT VISIT, EST, LEVL IV, 30-39 MIN: ICD-10-PCS | Mod: S$PBB,,, | Performed by: INTERNAL MEDICINE

## 2023-11-15 PROCEDURE — 87502 INFLUENZA DNA AMP PROBE: CPT | Mod: PBBFAC,PO | Performed by: INTERNAL MEDICINE

## 2023-11-15 PROCEDURE — 99999PBSHW POCT INFLUENZA A/B: ICD-10-PCS | Mod: 59,PBBFAC,,

## 2023-11-15 RX ORDER — PROMETHAZINE HYDROCHLORIDE AND DEXTROMETHORPHAN HYDROBROMIDE 6.25; 15 MG/5ML; MG/5ML
5 SYRUP ORAL EVERY 12 HOURS PRN
Qty: 100 ML | Refills: 0 | Status: SHIPPED | OUTPATIENT
Start: 2023-11-15 | End: 2023-11-25

## 2023-11-15 RX ORDER — OSELTAMIVIR PHOSPHATE 75 MG/1
75 CAPSULE ORAL 2 TIMES DAILY
Qty: 10 CAPSULE | Refills: 0 | Status: SHIPPED | OUTPATIENT
Start: 2023-11-15 | End: 2023-11-20

## 2023-11-15 NOTE — TELEPHONE ENCOUNTER
----- Message from Linsey Casanova sent at 11/15/2023 10:50 AM CST -----  Type: RX Refill Request    Who Called: pt     Have you contacted your pharmacy:yes pharmacy doesn't have any in stock. Call pt     Refill or New Rx:oseltamivir (TAMIFLU) 75 MG capsule      RX Name and Strength:    How is the patient currently taking it? (ex. 1XDay):    Is this a 30 day or 90 day RX:    Preferred Pharmacy with phone number:    Local or Mail Order:    Ordering Provider:    Would the patient rather a call back or a response via [facility]?     Best Call Back Number:    Additional Information:

## 2023-11-15 NOTE — TELEPHONE ENCOUNTER
Called pt says she's in route to [facility] on [address] because they have it in stock an dwas told by pharmacist  they transferred it over

## 2023-11-15 NOTE — PROGRESS NOTES
Health Maintenance Due   Topic     Cervical Cancer Screening      HIV Screening      RSV Vaccine (Age 60+) (1 - 1-dose 60+ series)     Hemoglobin A1c (Prediabetes)      Shingles Vaccine (2 of 2)     Influenza Vaccine (1)     COVID-19 Vaccine (4 - 2023-24 season)

## 2023-11-16 ENCOUNTER — TELEPHONE (OUTPATIENT)
Dept: FAMILY MEDICINE | Facility: CLINIC | Age: 63
End: 2023-11-16
Payer: MEDICARE

## 2023-11-16 DIAGNOSIS — R74.8 ELEVATED LIVER ENZYMES: Primary | ICD-10-CM

## 2023-11-16 DIAGNOSIS — R74.01 ELEVATION OF LEVELS OF LIVER TRANSAMINASE LEVELS: ICD-10-CM

## 2023-11-16 NOTE — TELEPHONE ENCOUNTER
Patient had routine lab work for neurologist noted elevated  ast and alt she has positive flu test yesterday as well will plan to repeat liver enzymes in one month

## 2023-11-16 NOTE — TELEPHONE ENCOUNTER
----- Message from Leta Arboleda sent at 11/16/2023  2:47 PM CST -----  Regarding: [phone]  Type: Patient Call Back    Who called: Dr. Bharath Amaya    What is the request in detail: will like to speak with Dr. Jacobsen in regards to the pt health    Can the clinic reply by [facility]?no     Would the patient rather a call back or a response via [facility]? Call back     Best call back number:[phone] ext [phone]    Additional Information:

## 2023-12-15 ENCOUNTER — TELEPHONE (OUTPATIENT)
Dept: FAMILY MEDICINE | Facility: CLINIC | Age: 63
End: 2023-12-15
Payer: MEDICARE

## 2023-12-15 ENCOUNTER — LAB VISIT (OUTPATIENT)
Dept: LAB | Facility: HOSPITAL | Age: 63
End: 2023-12-15
Attending: INTERNAL MEDICINE
Payer: MEDICARE

## 2023-12-15 DIAGNOSIS — R74.8 ELEVATED LIVER ENZYMES: ICD-10-CM

## 2023-12-15 DIAGNOSIS — R74.01 ELEVATION OF LEVELS OF LIVER TRANSAMINASE LEVELS: ICD-10-CM

## 2023-12-15 LAB
ALBUMIN SERPL BCP-MCNC: 4.1 G/DL (ref 3.5–5.2)
ALP SERPL-CCNC: 120 U/L (ref 55–135)
ALT SERPL W/O P-5'-P-CCNC: 10 U/L (ref 10–44)
AST SERPL-CCNC: 16 U/L (ref 10–40)
BILIRUB DIRECT SERPL-MCNC: 0.2 MG/DL (ref 0.1–0.3)
BILIRUB SERPL-MCNC: 0.4 MG/DL (ref 0.1–1)
HAV IGM SERPL QL IA: NORMAL
HBV CORE IGM SERPL QL IA: NORMAL
HBV SURFACE AG SERPL QL IA: NORMAL
HCV AB SERPL QL IA: NORMAL
PROT SERPL-MCNC: 8 G/DL (ref 6–8.4)

## 2023-12-15 PROCEDURE — 80076 HEPATIC FUNCTION PANEL: CPT | Performed by: INTERNAL MEDICINE

## 2023-12-15 PROCEDURE — 36415 COLL VENOUS BLD VENIPUNCTURE: CPT | Performed by: INTERNAL MEDICINE

## 2023-12-15 PROCEDURE — 80074 ACUTE HEPATITIS PANEL: CPT | Performed by: INTERNAL MEDICINE

## 2024-02-07 DIAGNOSIS — E11.9 TYPE 2 DIABETES MELLITUS WITHOUT COMPLICATION: ICD-10-CM

## 2024-04-19 ENCOUNTER — TELEPHONE (OUTPATIENT)
Dept: FAMILY MEDICINE | Facility: CLINIC | Age: 64
End: 2024-04-19
Payer: MEDICARE

## 2024-04-19 ENCOUNTER — OFFICE VISIT (OUTPATIENT)
Dept: URGENT CARE | Facility: CLINIC | Age: 64
End: 2024-04-19
Payer: MEDICARE

## 2024-04-19 VITALS
BODY MASS INDEX: 29.73 KG/M2 | TEMPERATURE: 98 F | DIASTOLIC BLOOD PRESSURE: 88 MMHG | SYSTOLIC BLOOD PRESSURE: 150 MMHG | WEIGHT: 185 LBS | HEART RATE: 72 BPM | RESPIRATION RATE: 16 BRPM | OXYGEN SATURATION: 96 % | HEIGHT: 66 IN

## 2024-04-19 DIAGNOSIS — M79.674 GREAT TOE PAIN, RIGHT: ICD-10-CM

## 2024-04-19 DIAGNOSIS — S99.921S: Primary | ICD-10-CM

## 2024-04-19 PROCEDURE — 99203 OFFICE O/P NEW LOW 30 MIN: CPT | Mod: S$GLB,,,

## 2024-04-19 PROCEDURE — 73660 X-RAY EXAM OF TOE(S): CPT | Mod: RT,S$GLB,, | Performed by: RADIOLOGY

## 2024-04-19 NOTE — PATIENT INSTRUCTIONS
Follow RICE supportive care:    Rest  Ice the affected area  Elevate the affected extremity and toe.    Ibuprofen as needed for pain and swelling    Please follow up with your primary care doctor or specialist as needed.    If you experience any significant increase in pain, numbness, tingling, discoloration or loss of motor function follow up immediately in the Emergency Department.     Call to make an appointment with podiatry at [phone].

## 2024-04-19 NOTE — TELEPHONE ENCOUNTER
Spoke to pt's mother, she stated that pt stomped her toe and her nail is  from the toe and bleeding. Ms. Mehta was advised to take pt to the ED or urgent care to be evaluated and treated. [facility] due to insurance. Pt's mother stated understanding and will bring her to be urgent care.

## 2024-04-19 NOTE — TELEPHONE ENCOUNTER
----- Message from Noemi Pak MA sent at 4/19/2024 10:55 AM CDT -----  Type: Patient Call Back    Who called: Mother Nestor Reynolds    What is the request in detail: is requesting a referral to podiatry for her .. States she hit her toe last night and it's bleeding/sore..     Can the clinic reply by [facility]?NO    Would the patient rather a call back or a response via [facility]? Yes, call     Best call back number: [phone] (home)

## 2024-04-19 NOTE — PROGRESS NOTES
PROGRESS NOTE  Patient Name: Rachana Arguelles  Age/Sex: 74 y.o. female  : 1944  MRN: [id number]    Date of Admission: 2019  Date of Encounter Visit: 19   LOS: 6 days   Patient Care Team:  Rhys Crowder Jr., MD as PCP - General (Family Medicine)  Whitney Dudley MD as Consulting Physician (Nephrology)    Chief Complaint: Transferred to ICU for agitated delirium, currently on the ventilator    Hospital course: Patient has chronic pain and was on chronic pain medication and antianxiety medication, had lung cancer status post video-assisted thoracoscopic procedure, and went into withdrawal and agitation that required transfer to the ICU for proper sedation, patient did get oversedated and had to be intubated at the cardiac thoracic surgery request because of the concern about  not being able to cough and clear secretions especially given the finding on the bronchoscopy intraoperatively    Interval History:  Patient is still on the Precedex drip, she had episode of significant agitation when she pulled on her IV access, she does have some worsening edema in the right upper extremity which is asymmetrical.  She was started on her home regimen at a lower dose but after further evaluation by the neurology consultant team and suspecting significant metabolic encephalopathy they did revise her regimen and she is now on scheduled and when necessary Dilaudid and Ativan IV.  No fever or chills  She is on day #3 of mechanical ventilation  She may follow commands but inconsistently  She did have a dose of Geodon around midnight last night as ordered by the neurology team.     REVIEW OF SYSTEMS:   Intubated, on the ventilator l  Restless at times with agitation depending on the level of sedation    Ventilator/Non-Invasive Ventilation Settings (From admission, onward)    Assist-control ventilation             Vital Signs  Temp:  [98.1 °F (36.7 °C)-98.9 °F (37.2 °C)] 98.3 °F (36.8 °C)  Heart Rate:  [50-79]  "Subjective:      Patient ID: Smita Coughlin is a 64 y.o. female.    Vitals:  height is 5' 6" (1.676 m) and weight is 83.9 kg (185 lb). Her oral temperature is 98.2 °F (36.8 °C). Her blood pressure is 150/88 (abnormal) and her pulse is 72. Her respiration is 16 and oxygen saturation is 96%.     Chief Complaint: Toe Pain    Pt present today for the left foot great toe pain that started last night when she stubbed her toe in the house.  Minor swelling to the toe with minimal bruising, no deformity or drainage.  Patient is ambulatory.  No medications taken for pain.  Mother is concerned about possible fracture.  No other complaints or injuries    Toe Pain   The incident occurred 6 to 12 hours ago. The incident occurred at home. There was no injury mechanism. The pain is present in the left foot. The pain is at a severity of 0/10. The patient is experiencing no pain. Nothing aggravates the symptoms. She has tried nothing for the symptoms. The treatment provided no relief.       Constitution: Negative for fever and generalized weakness.   HENT:  Negative for ear pain, sinus pain and sore throat.    Neck: Negative for neck pain.   Cardiovascular:  Negative for chest pain.   Respiratory:  Negative for cough and shortness of breath.    Gastrointestinal:  Negative for abdominal pain.   Musculoskeletal:  Positive for joint pain (Right great toe).   Neurological:  Negative for headaches.      Objective:     Physical Exam   Constitutional: She is oriented to person, place, and time. She appears well-developed. She is cooperative.   HENT:   Head: Normocephalic and atraumatic.   Ears:   Right Ear: Hearing, tympanic membrane, external ear and ear canal normal.   Left Ear: Hearing, tympanic membrane, external ear and ear canal normal.   Nose: Nose normal. No mucosal edema or nasal deformity. No epistaxis. Right sinus exhibits no maxillary sinus tenderness and no frontal sinus tenderness. Left sinus exhibits no maxillary sinus " "66  Resp:  [14-21] 17  BP: (117-191)/() 135/54  FiO2 (%):  [30 %] 30 %  SpO2:  [96 %-100 %] 100 %  on  Flow (L/min):  [30] 30 Device (Oxygen Therapy): ventilator    Intake/Output Summary (Last 24 hours) at 1/17/2019 1245  Last data filed at 1/17/2019 0500  Gross per 24 hour   Intake 1572 ml   Output 1125 ml   Net 447 ml     Flowsheet Rows      First Filed Value   Admission Height  167.6 cm (65.98\") Documented at 01/12/2019 1820   Admission Weight  90.3 kg (198 lb 15.8 oz) Documented at 01/12/2019 1820        Body mass index is 32.13 kg/m².      01/12/19 1820   Weight: 90.3 kg (198 lb 15.8 oz)       Physical Exam:  GEN:  Patient is restless at times, on the ventilator, did follow commands but inconsistently, moving all extremities, good strength in both upper and lower extremities   EYES:   Sclera clear. No icterus. PERRL.  ENT:   External ears/nose normal, no oral lesions, no thrush, mucous membranes moist oral ET tube  NECK:  Supple, midline trachea, no JVD  LUNGS: Normal chest on inspection, chest tube in position, no subcutaneous air, good breath sounds bilaterally with no wheezes or crackles, on mechanical ventilator, breathing in sync with the ventilator.   CV:  Regular rhythm and rate. Normal S1/S2. No murmurs, gallops, or rubs noted.  ABD:  Soft, non-tender and non-distended. Normal bowel sounds. No guarding  EXT:  moving all extremities was no obvious focal deficit , No cyanosis. No redness.  Mild asymmetric right upper extremity edema from the previous infiltrated IV.   Skin: dry, intact, no bleeding    Results Review:      Results from last 7 days   Lab Units 01/17/19  0640 01/16/19  0811 01/15/19  0858 01/14/19  0630 01/13/19  0517 01/12/19  0458   SODIUM mmol/L 137 138 138 135* 136 135*   POTASSIUM mmol/L 4.4 4.2 4.5 4.8 4.8 4.4   CHLORIDE mmol/L 102 102 102 102 102 99   CO2 mmol/L 21.8* 28.4 22.9 26.4 21.3* 26.3   BUN mg/dL 29* 28* 22 23 28* 23   CREATININE mg/dL 1.38* 1.50* 1.57* 1.41* 1.75* " tenderness and no frontal sinus tenderness.   Mouth/Throat: Uvula is midline, oropharynx is clear and moist and mucous membranes are normal. No trismus in the jaw. Normal dentition. No uvula swelling.   Eyes: Conjunctivae and lids are normal.   Neck: Trachea normal and phonation normal. Neck supple.   Cardiovascular: Normal rate, regular rhythm, normal heart sounds and normal pulses.   Pulmonary/Chest: Effort normal and breath sounds normal.   Abdominal: Normal appearance and bowel sounds are normal. Soft.   Musculoskeletal: Normal range of motion.         General: Normal range of motion.      Right foot: Right great toe: Exhibits swelling and tenderness.   Neurological: She is alert and oriented to person, place, and time. She exhibits normal muscle tone.   Skin: Skin is warm, dry and intact.   Psychiatric: Her speech is normal and behavior is normal. Judgment and thought content normal.   Nursing note and vitals reviewed.      Assessment:     1. Injury of great toe, right, sequela    2. Great toe pain, right        Plan:   Physical exam as documented above.  X-ray negative for any fractures or dislocation, however it does show degenerative changes to the right great metatarsophalangeal joint.  Mother is concerned about possible loss of toenail of the great toe in the future, as well as toenail thickening on the right foot.  We will refer patient to Podiatry for follow up, mother acknowledges plan of care and is in agreeance.  Patient is ambulatory with normal gait, no complaints of pain at this time.  Discussed NSAID use and ice at home for swelling and pain. We will follow up with Podiatry and PCP as needed.    X-Ray Toe 2 or More Views Right    Result Date: 4/19/2024  EXAMINATION: XR TOE 2 OR MORE VIEWS RIGHT CLINICAL HISTORY: Pain in right toe(s) TECHNIQUE: Three views of the right toes were performed COMPARISON: None FINDINGS: The alignment and mineralization is normal.  Severe 1st metatarsophalangeal joint  1.60*   CALCIUM mg/dL 8.8 8.9 8.6 8.7 9.1 8.8   ANION GAP mmol/L 13.2 7.6 13.1 6.6 12.7 9.7   Results for TRISHA SOTO (MRN [id number]) as of 1/15/2019 14:40   Ref. Range 1/15/2019 04:20   Vitamin B-12 Latest Ref Range: 211 - 946 pg/mL 241                 Results from last 7 days   Lab Units 01/17/19  0640 01/16/19  0811 01/15/19  0858 01/14/19  0630 01/13/19  0517 01/12/19  0458   WBC 10*3/mm3 11.80* 7.61 8.83 8.87 10.79* 12.62*   HEMOGLOBIN g/dL 8.4* 8.1* 8.0* 7.5* 8.3* 8.7*   HEMATOCRIT % 27.3* 24.9* 25.8* 23.6* 26.8* 28.7*   PLATELETS 10*3/mm3 254 251 229 177 211 260   MCV fL 98.2 95.0 97.7 97.5 99.3* 97.0   NEUTROPHIL % %  --   --   --  67.9 76.6* 77.9*   LYMPHOCYTE % %  --   --   --  18.8* 13.3* 11.6*   MONOCYTES % %  --   --   --  11.2 9.4 10.4   EOSINOPHIL % %  --   --   --  1.4 0.3 0.0*   BASOPHIL % %  --   --   --  0.1 0.1 0.1   IMM GRAN % %  --   --   --  0.6* 0.3 0.3                   Invalid input(s): LDLCALC  Results from last 7 days   Lab Units 01/15/19  1715 01/15/19  1546 01/15/19  0059   PH, ARTERIAL pH units 7.364 7.387 7.379   PCO2, ARTERIAL mm Hg 47.0* 46.1* 47.3*   PO2 ART mm Hg 71.2* 69.0* 78.0*   HCO3 ART mmol/L 26.8 27.7 28.0         Glucose   Date/Time Value Ref Range Status   01/17/2019 1126 152 (H) 70 - 130 mg/dL Final   01/16/2019 1929 143 (H) 70 - 130 mg/dL Final   01/16/2019 1301 135 (H) 70 - 130 mg/dL Final   01/16/2019 0317 128 70 - 130 mg/dL Final   01/15/2019 2005 125 70 - 130 mg/dL Final   01/15/2019 1538 112 70 - 130 mg/dL Final   01/15/2019 1124 103 70 - 130 mg/dL Final   01/15/2019 0810 91 70 - 130 mg/dL Final         Results from last 7 days   Lab Units 01/15/19  1621   RESPCX  Scant growth (1+) Normal Respiratory Akila     Results from last 7 days   Lab Units 01/15/19  0103   NITRITE UA  Negative   WBC UA /HPF 0-2   BACTERIA UA /HPF None Seen   SQUAM EPITHEL UA /HPF 0-2               Imaging:   Imaging Results (all)     Procedure Component Value Units Date/Time          space narrowing and osteophyte formation.  No cystic geodes or erosions.  No fracture, no osseous lesions.  The soft tissues appear normal.     Advanced degenerative change at the 1st metatarsophalangeal joint. Electronically signed by: Kelli Brown MD Date:    04/19/2024 Time:    14:56       Injury of great toe, right, sequela  -     Ambulatory referral/consult to Podiatry    Great toe pain, right  -     X-Ray Toe 2 or More Views Right; Future; Expected date: 04/19/2024      Patient Instructions   Follow RICE supportive care:    Rest  Ice the affected area  Elevate the affected extremity and toe.    Ibuprofen as needed for pain and swelling    Please follow up with your primary care doctor or specialist as needed.    If you experience any significant increase in pain, numbness, tingling, discoloration or loss of motor function follow up immediately in the Emergency Department.     Call to make an appointment with podiatry at [phone].                   Improved lung volumes, some linear atelectasis over the left lower lobe, ET tube is in better position after it was readjusted post intubation  Medication Review:     albuterol sulfate HFA 6 puff Inhalation Q4H - RT   amLODIPine 10 mg Oral Q24H   atorvastatin 40 mg Oral Nightly   famotidine 20 mg Oral BID   heparin (porcine) 5,000 Units Subcutaneous Q8H   HYDROmorphone 0.5 mg Intravenous Q6H   lansoprazole 30 mg Nasogastric QAM   LORazepam 0.5 mg Intravenous Q6H   metoprolol tartrate 25 mg Oral Q12H   sennosides-docusate sodium 2 tablet Oral Nightly   sodium chloride 4 mL Nebulization Once   sucralfate 1 g Oral 4x Daily         dexmedetomidine 0.2-1.5 mcg/kg/hr Last Rate: 1.2 mcg/kg/hr (01/17/19 1114)   lactated ringers 9 mL/hr Last Rate: Stopped (01/11/19 1108)   niCARdipine 5-15 mg/hr Last Rate: 5 mg/hr (01/17/19 1114)       ASSESSMENT:   1. Hyperactive delirium  2. Respiratory failure, on the ventilator mainly for airway protection  3. Benzo/opiate withdrawal  4. Anemia  5. Lung cancer post VATS  6. Pain control  7. Chronic kidney disease  8. COPD  9. Chronic back pain  10. Hypertension  11. Restless leg syndrome  12. Anemia    PLAN:  Pain and sedative regimen were modified by neurology so we will continue with her adjustment meanwhile will try to wean the Precedex as tolerated, the plan is likely to extubate on the Precedex depending on her level of agitation.  Cultures from the ET tube suction is still showing no growth  Patient had right upper extremity edema that is asymmetrical and will order a DVT workup with venous Doppler ultrasound, hold on the PICC line until the ultrasound is done.  As far as the Plavix, she does not have any previous angioplasty or stents so it can be held as far as I'm concerned but will defer the final decision to the primary team if any problem or contraindication to be resumed after a chest surgery    Cussed with Dr. Olson, discussed with the rounding team and discussed with the  family at the bedside          Disposition: monitor in ICU today    Umesh Rosenberg MD  01/17/19  12:45 PM    \      Dictated utilizing Dragon dictation

## 2024-04-24 ENCOUNTER — OFFICE VISIT (OUTPATIENT)
Dept: PODIATRY | Facility: CLINIC | Age: 64
End: 2024-04-24
Payer: MEDICARE

## 2024-04-24 VITALS
HEART RATE: 68 BPM | BODY MASS INDEX: 29.72 KG/M2 | HEIGHT: 66 IN | DIASTOLIC BLOOD PRESSURE: 96 MMHG | WEIGHT: 184.94 LBS | SYSTOLIC BLOOD PRESSURE: 176 MMHG

## 2024-04-24 DIAGNOSIS — M20.5X1 HALLUX LIMITUS, ACQUIRED, RIGHT: ICD-10-CM

## 2024-04-24 DIAGNOSIS — M20.5X2 HALLUX LIMITUS, ACQUIRED, LEFT: ICD-10-CM

## 2024-04-24 DIAGNOSIS — B35.1 ONYCHOMYCOSIS DUE TO DERMATOPHYTE: Primary | ICD-10-CM

## 2024-04-24 PROCEDURE — 99213 OFFICE O/P EST LOW 20 MIN: CPT | Mod: PBBFAC,PO | Performed by: PODIATRIST

## 2024-04-24 PROCEDURE — 99203 OFFICE O/P NEW LOW 30 MIN: CPT | Mod: S$PBB,,, | Performed by: PODIATRIST

## 2024-04-24 PROCEDURE — 99999 PR PBB SHADOW E&M-EST. PATIENT-LVL III: CPT | Mod: PBBFAC,,, | Performed by: PODIATRIST

## 2024-04-24 NOTE — PATIENT INSTRUCTIONS
Over the counter pain creams: Voltaren Gel, Biofreeze, Bengay, tiger balm, two old goat, lidocaine gel,  Absorbine Veterinary Liniment Gel Topical Analgesic Sore Muscle and Joint Pain Relief    Recommend lotions: eucerin, eucerin for diabetics, aquaphor, A&D ointment, gold bond for diabetics, sween, Monteagle's Bees all purpose baby ointment,  urea 40 with aloe or SkinIntegra rapid crack repair (found on amazon.com)    Shoe recommendations: (try 6pm.com, zappos.com , nordstromrack.Idea Device, or shoes.Idea Device for discounted prices) you can visit varsity shoes in [place], DSW shoes in [place]  or morelia rack in the [facility] (there are also several shoe brand outlets in the [facility])    ONLY purchase stability style tennis shoes NO flex, foam, free, yoga mat style shoes    Shoe examples:    Asics (GT 4000 or gel foundations), new balance stability type shoes (such as the 940 series), saucony (stabil c3),  Julian (GTS or Beast or   transcend), propet, HokaOne (tennis shoe) Osvaldo (tennis shoes and boots)    Sofft Brand (women) Ghanshyam&Juan (men), clarks, crosasha, aerosoles, naturalizers, SAS, ecco, born, rona arnett, rockports (dress shoes)    Vionic, burkenstocks, fitflops, propet, taos, baretraps (sandals)    HokaOne sandals, crocs, propet (house shoes)      Nail Home remedy:  Vicks Vapor rub or Emuaid to nails for easier manageability      Athletes Foot     Athletes Foot is caused by a fungal infection in the skin. It affects the skin between the toes where it causes fissures (cracks in the skin). It can also affect the bottom of the foot where it causes dry white scales and peeling of the skin. This infection is more likely to occur when the foot is in hot, sweaty socks and shoes for long periods of time.   This infection is treated with skin creams or oral medicine.     Home Care:   It is important to keep the feet dry. Use absorbent cotton socks and change them if they become sweaty; or wear an open-toe shoe or  sandal. Wash the feet at least once a day with Nizoral shampoo or dial antibacterial soap and water then dry completely. Avoid soaking and prolonged exposure to water  Rotate your shoes. If you must wear the same shoes everyday then spray the shoes with lysol or antifungal spray and allow that to dry overnight before wearing the shoes again  Apply the antifungal cream as prescribed. Some antifungal creams are available without a prescription (Lotrimin, Tinactin).   It may take 2 weeks before the rash starts to improve and it can take about three to ten weeks to completely clear. Continue the medicine until the rash is all gone.   Use over-the-counter antifungal powders or sprays on your feet after exposure to high-risk environments (public showers, gyms and locker rooms) may prevent future infections. You may wish to use appropriate footwear to reduce exposure.  Clean tubs and bathroom floor with bleach    Follow Up   with your doctor as recommended by our staff if the rash is not starting to improve after TEN days of treatment, or if the rash continues to spread.     Get Prompt Medical Attention   if any of the following occur:   Increasing redness or swelling of the foot   Pus draining from cracks in the skin   Fever of 100.4ºF (38ºC) or higher, or as directed by your healthcare provider    © 7197-1694 Formerly [facility], [address] [place] [address], [place] [id number]. All rights reserved. This information is not intended as a substitute for professional medical care. Always follow your healthcare professional's instructions.               Understanding Thickened Nails    There are several causes of very thick or crumbling nails. They can be caused by injuries or pressure from shoes. Fungal infections are a common cause. Diabetes, psoriasis, or vascular disease are other possible causes.  Fungal infections of the nails are also known as dermatophytic onychomycosis, or tinea unguium. The responsible fungus is usually the  same as that that causes athletes foot - a common infection of the skin of the feet, especially between the toes. In athletes foot the responsible fungus lives in the keratin that makes up the outer layer of the skin. When the fungus spreads to the keratin of the nails, the result is a fungal nail infection.  Symptoms  Along with thickening, the nail may appear ridged, brittle, or yellowish. It may also feel painful when pressure is put on it. After a while, a thickened nail may loosen and fall off.  Evaluation  Because thickened nails may be a symptom of a medical condition, your healthcare provider will look at your medical history. A test may be done to check for fungal infection. The thickness and color of the nail are examined carefully. This helps determine the cause.  Can fungal infections of the nails be cured:  Yes. However, to successfully cure fungal toenail infection requires long treatment and may take up to a year. Fingernails are easier to treat. Fungal nail infections commonly recur, especially on the toes.  Treatment  For infection: Oral or topical antifungal medicines may be used to treat infection. These can help prevent sores under the nail. They also keep the fungus from spreading to other nails.  For thick nails not caused by infection: Thinning the nail may be an option. This can be done by trimming, filing, or grinding.  For pain: If the nail causes pain, part or all of the nail can be removed with surgery. Never try to remove a nail by yourself.     Treatments applied to the nail work less well than those taken by mouth. They are most effective if the infection is treated at an early stage. The treatments used most often are amorolfine nail lacquer and tioconazole nail solution. Alone, they may not be able to clear the deeper parts of an infected nail, though regular removal of abnormal nail material with clippers or filing can help with this. Used in combination with an antifungal remedy  taken by mouth, they increase the chance of a cure. They may have to be used for a period of 4 to 12 months before a response is noted. The course of treatment is shorter for fingernail infections. The cure rate with topical agents alone, is low, approximately 15-30%. Topical treatments are safe. Local redness and irritation can occur.  Treatment by mouth (oral treatments) Before starting on tablets, the doctor should send a piece of the nail to the laboratory to check that the diagnosis of a fungal infection is confirmed. Three medicines are available for use in fungal nail infections:   Griseofulvin has been used for many years and is the only one of the three medicines licensed for use in children. It is only absorbed fully if taken with fatty foods (e.g. dairy products and milk), and long courses of treatment are usually needed (6 to 9 months for fingernails, and up to 18 months for toenails). Even so, only about three quarters of infected fingernails and one third of infected toenails will clear up. Relapses are common.   Terbinafine and itraconazole have largely taken over from griseofulvin now. They work better and much more quickly, although only about 50% of nail infections are cured. Terbinafine should be considered as first line treatment for dermatophyte fungi (the ones that cause athletes foot). It is taken daily for 6 weeks for fingernail infections and for 12-16 weeks for toenail infections.   Itraconazole is effective in treating dermatophytes too; it is also useful for treating other fungi such as yeasts. It is often given in bursts - for one week in every month - because it is deposited into the cuticle of the nail and continues to be effective for a few weeks. Two of these weekly courses, taken 21 days apart, are usually enough for fingernail infections and three for toenail infections.   Fluconazole can be effective for Candida fungal infections. It is currently not licensed for fungal nail  infections. It appears to be less effective than itraconazole and terbinafine but remains an alternative if someone is not able to tolerate these two medications.  Other treatments   Laser treatments, photodynamic therapy may be helpful but are less effective than the topical and systemic treatments listed above. These treatment options are not currently available on the NHS.   Herbal products are promoted for fungal nail infection, but there is no good evidence that they are safe or more effective than standard  Surgical removal of nails: Sometimes very thick nails that are not likely to respond to tablets alone may have to be removed by surgeons under a local anaesthetic, however this is rarely performed since cure rates are not high enough to justify the discomfort of the surgery.      How will I know if the treatment is working?  The new nail will grow slowly outwards from its base, and it may be 6 months to a year after the treatment has finished before the nails look normal again.    Prevention  Many nail problems can be prevented by wearing the right shoes and trimming your nails properly. Keep your feet clean and dry to help avoid infection. If you have diabetes, talk with your healthcare provider before doing any foot self-care.  The right shoes: Get your feet measured. Your size may change as you age. This is due to ligaments that loosen with age and allow the bones in your foot to change position or spread. Wear shoes that are supportive and roomy enough for your toes to wiggle. Look for shoes made of natural materials, such as leather, which allow your feet to breathe.  Proper trimming: To avoid problems, trim your toenails straight across. Then file the edges with a nail file. If you cant trim your own nails, ask your healthcare provider to do so for you.    Are they hereditary?  Generally speaking, no. However, in some extremely rare cases there is a  genetic risk factor and other family members can  also be affected.  What are the symptoms of fungal infections of the nails?  At the start, there are usually no symptoms. Later the nails may become so  thick that they hurt when they press on the inside of a shoe. They are then  hard to trim. The look of an infected nail, particularly a fingernail, may cause  embarrassment. The abnormal nail can damage socks and tights, and may  cut into the adjacent skin. The skin nearby may also have a fungal infection; it  may itch, crack, form a blister or appear white, especially between the toes.  What do fungal infections of the nails look like?  When fungi infect a nail, they usually start at its free edge, and then spread  down the side of the nail towards the base of the cuticle. Eventually the whole  nail may be involved. The infected areas turn white or yellowish, and become  thickened and crumbly. Less commonly there may be white areas on the nail  surface. The nails most commonly affected by fungal infections are those on  the big and little toes. Sometimes, especially in those who carry out regular  wet work such as housewives or , the skin around the fingernail  becomes red and swollen. This is called paronychia, and can allow infection  to get to the nail.      Wearing Proper Shoes                    You walk on your feet every day, forcing them to support the weight of your body. Repeated stress on your feet can cause damage over time. The right shoes can help protect your feet. The wrong shoes can cause more foot problems. Read the information below to help you find a shoe that fits your foot needs.     A good shoe fit will cover your foot outline.       A shoe that doesnt cover the outline is a bad fit.      Whats Your Foot Shape?  To get a good fit, you need to know the shape of your foot. Do this simple test: While standing, place your foot on a piece of paper and trace around it. Is your foot straight or curved? Do you have a foot problem, such as a  bunion, that causes your foot outline to show a bulge on the side of your big toe?  Finding Your Fit  Bring your foot outline to the shore store to help you find the right shoe. Place a shoe you like on top of the outline to see if it matches the shape. The shoe should cover the outline. (If you have a bunion, the shoe may not cover the bulge on the outline. Look for soft leather shoes to stretch over the bunion.) Once youve found a pair of proper shoes, put them on. Walk around. Be sure the shoes dont rub or pinch. If the shoes feel good, youve found your fit!  The Right Shoe for You  A good shoe has features that provide comfort and support. It must also be the right size and shape for your feet. Look for a shoe made of breathable fabric and lining, such as leather or canvas. Be sure that shoes have enough tread to prevent slipping. Go to a good shoe store for help finding the right shoe.  Good Shoe Features  An ideal shoe has the following:  Laces for support. If tying laces is a problem for you, try shoes with Velcro fasteners or kurt.  A front of the shoe (toe box) with ½ inch space in front of your longest toes.  An arch shape that supports your foot.  No more than 1½ inches of heel.  A stiff, snug back of the shoe to keep your foot from sliding around.  A smooth lining with no rough seams.  Shoe Shopping Tips  Below are some dos and donts for when you go to the shoe store.  Do:  Select the shoes that feel right. Wear them around the house. Then bring them to your foot doctor to check for fit. If they dont fit well, return them.  Shop late in the day, when your feet will be slightly bigger.  Each time you buy shoes, have both your feet measured while you are standing. Foot size changes with time.  Pick shoes to suit their purpose. High heels are okay for an occasional night on the town. But for everyday wear, choose a more sensible shoe.  Try on shoes while wearing any inserts specially made for your  feet (orthoses).  Try on both the right and left shoes. If your feet are different sizes, pick a pair that fits the larger foot.  Dont:  Dont buy shoes based on shoe size alone. Always try on shoes, as sizes differ from brand to brand and within brands.  Dont expect shoes to break in. If they dont fit at the store, dont buy them.  Dont buy a shoe that doesnt match your foot shape.  What About Socks?  Always wear socks with shoes. Socks help absorb sweat and reduce friction and blistering. When shopping for shoes, choose soft, padded socks with seams that dont irritate your feet.  If You Have Foot Problems  Some foot problems cause deformities. This can make it hard to find a good fit. Look for shoes made of soft leather to stretch over the deformity. If you have bunions, buy shoes with a wider toe box. To fit hammertoes, look for shoes with a tall toe box. If you have arch problems, you may need inserts. In some cases, youll need to have custom footwear or orthoses made for your feet.  Suggested Footwear  Ask your healthcare provider what kind of footwear you need. He or she may recommend a certain brand or shoe store.  © 6504-4566 [employer]. [address] [place] [address]. All rights reserved. This information is not intended as a substitute for professional medical care. Always follow your healthcare professional's instructions.      Toe Extension (Flexibility)    These instructions are for your right foot. Switch sides for your left foot.  Sit in a chair. Rest your right ankle on your left knee.  Hold your toes with your right hand. Gently bend the toes backward. Feel a stretch in the undersides of the toes and ball of the foot. Hold for 30 to 60 seconds.  Then gently bend the toes in the other direction. Gently press on them until your foot is pointed. Hold for 30 to 60 seconds.  Repeat 5 times, or as instructed.  © 3489-3406 [employer]. [address] [place] [address]  Road, [place] [address]. All rights reserved. This information is not intended as a substitute for professional medical care. Always follow your healthcare professional's instructions.

## 2024-04-24 NOTE — PROGRESS NOTES
Subjective:      Patient ID: Smita Coughlin is a 64 y.o. female.    Chief Complaint: Foot Pain (Right great toe nail coming off ) and Fungus (B/L )    Smita is a 64 y.o. female who presents to the clinic complaining of thick and discolored toenails on both feet.  Presents with her mother who access historian during this visit.  Patient presented to urgent Care on 04/19 after stubbing the toe and having some discoloration under the nail, no fracture or dislocation was noted.  Her mom was concerned about subsequent changes to the nail and believes that the nail may fall off but states that it appears to have remained firmly attached to the nail bed.  She denies seeing any active drainage, redness.  She states that the patient does not complain of pain.  Smita is inquiring about treatment options.    Patient Active Problem List   Diagnosis    Mental retardation    Anxiety    Seizure disorder    At high risk for breast cancer    Diarrhea    Colon cancer high risk    Cough    Acute bronchitis    Overweight (BMI 25.0-29.9)    Impaired fasting blood sugar    Breast cancer screening, high risk patient       Current Outpatient Medications on File Prior to Visit   Medication Sig Dispense Refill    carbamazepine (TEGRETOL XR) 200 MG 12 hr tablet Take 200 mg by mouth 3 (three) times daily after meals.      naproxen (NAPROSYN) 500 MG tablet Take 1 tablet (500 mg total) by mouth 2 (two) times daily with meals. 60 tablet 0     No current facility-administered medications on file prior to visit.       Review of patient's allergies indicates:  No Known Allergies    Past Surgical History:   Procedure Laterality Date    lap bela         Family History   Problem Relation Name Age of Onset    No Known Problems Mother          Afib    No Known Problems Father          CAD    No Known Problems Brother         Social History     Socioeconomic History    Marital status: Single   Tobacco Use    Smoking status: Never     "Smokeless tobacco: Never       Review of Systems   Reason unable to perform ROS: patient with mental retardation.         Objective:      Vitals:    04/24/24 0821   BP: (!) 176/96   Pulse: 68   Weight: 83.9 kg (184 lb 15.5 oz)   Height: 5' 6" (1.676 m)       Physical Exam  Vitals and nursing note reviewed.   Constitutional:       General: She is not in acute distress.     Appearance: She is well-developed. She is not toxic-appearing or diaphoretic.      Comments: alert and oriented x 3.    Cardiovascular:      Pulses:           Dorsalis pedis pulses are 2+ on the right side and 2+ on the left side.        Posterior tibial pulses are 2+ on the right side and 2+ on the left side.      Comments:  Capillary refill time is within normal limits. Digital hair present.   Pulmonary:      Effort: No respiratory distress.   Musculoskeletal:         General: No deformity.      Right ankle: No tenderness. No lateral malleolus, medial malleolus, AITF ligament, CF ligament or posterior TF ligament tenderness.      Right Achilles Tendon: No defects. Arboleda's test negative.      Left ankle: No tenderness. No lateral malleolus, medial malleolus, AITF ligament, CF ligament or posterior TF ligament tenderness.      Left Achilles Tendon: No defects. Arboleda's test negative.      Right foot: No tenderness or bony tenderness.      Left foot: No tenderness or bony tenderness.      Comments: Muscle strength is 5/5 in all groups bilaterally.    Decreased first MPJ range of motion both weightbearing and nonweightbearing, +crepitus observed the first MP joint, + dorsal flag sign.    Feet:      Right foot:      Protective Sensation: 5 sites tested.  5 sites sensed.      Skin integrity: Skin integrity normal.      Left foot:      Protective Sensation: 5 sites tested.  5 sites sensed.      Skin integrity: Skin integrity normal.   Lymphadenopathy:      Comments: No lymphatic streaking     Skin:     General: Skin is warm and dry.      " Coloration: Skin is not pale.      Findings: No rash.      Nails: There is no clubbing.      Comments: Skin is of normal turgor.   Normal temperature gradient.  Examination of the skin reveals no evidence of significant rashes, open lesions, suspicious appearing nevi or other concerning lesions.     Toenails 1-5 bilaterally are elongated by 2-3 mm, thickened by 2-3 mm, discolored/yellowed, dystrophic, brittle with subungual debris.   Neurological:      Sensory: No sensory deficit.      Motor: No atrophy.      Comments: Light touch present     Psychiatric:         Attention and Perception: She is attentive.         Mood and Affect: Mood is not anxious. Affect is not inappropriate.         Speech: She is communicative. Speech is not slurred.         Behavior: Behavior is not combative.             Assessment:       Encounter Diagnoses   Name Primary?    Onychomycosis due to dermatophyte Yes    Hallux limitus, acquired, right     Hallux limitus, acquired, left          Plan:     Problem List Items Addressed This Visit    None  Visit Diagnoses       Onychomycosis due to dermatophyte    -  Primary    Hallux limitus, acquired, right        Hallux limitus, acquired, left                 I counseled the patient on her conditions, their implications and medical management.    Shoe inspection. Patient instructed on proper foot hygeine. We discussed wearing proper shoe gear, daily foot inspections, never walking without protective shoe gear.     Based on chart review this patient does not qualify for nail care (Patients who qualify for nail care are usually as follows: diabetic with neurological manifestations, PVD, pernicious anemia, or CKD with appropriate modifiers that indicate high amputation risk).     Patients without pedal manifestations of certain illnesses, do not qualify for nail/callus trimming      Informed patient that many nail problems can be prevented by wearing the right shoes and trimming your nails  properly.   The right shoes:  Patient is to wear shoes that are supportive and roomy enough for toes to wiggle. Look for shoes made of natural materials such as leather, which allow  feet to breathe.   Proper trimming: To avoid problems, she was instructed to trim toenails straight across without cutting down into the corners.      Discussed all treatment options of fungal nail such as topical, oral, laser/cosmetic treatments vs surgical removal of nail permanent vs non-permanent in detail pertaining to nail fungus. Instructed patient on the importance of keeping fungus off of skin while treating nails. Patient instructed to use absorbent cotton socks and change them if they become sweaty; or wear an open-toe shoe or sandal. Wash the feet at least once a day with soap and water. Patient wants to try OTC methods. Instructed patient that it takes time for symptoms to completely dissipate. Patient instructed to use lysol or over-the-counter antifungal powders or sprays to shoes daily and allow them to air dry, switching shoes from every other day would be optimal. Patient is to avoid barefoot walking in  high-risk environments (public showers, gyms and locker rooms) may prevent future infections.     RTC PRN

## 2024-06-04 ENCOUNTER — TELEPHONE (OUTPATIENT)
Dept: FAMILY MEDICINE | Facility: CLINIC | Age: 64
End: 2024-06-04
Payer: MEDICARE

## 2024-06-04 DIAGNOSIS — Z12.31 ENCOUNTER FOR SCREENING MAMMOGRAM FOR MALIGNANT NEOPLASM OF BREAST: Primary | ICD-10-CM

## 2024-06-04 NOTE — TELEPHONE ENCOUNTER
----- Message from Sujey Simon sent at 6/4/2024  9:57 AM CDT -----  Regarding: Requsting mammo  .Type:  Mammogram    Caller is requesting to schedule their annual mammogram appointment.  Order is not listed in EPIC.  Please enter order and contact patient to schedule.    Name of Caller: Uyen      Where would they like the mammogram performed? [facility]     Would the patient rather a call back or a response via [facility]? Call     Best Call Back Number: [phone]      Additional Information:

## 2024-07-05 ENCOUNTER — HOSPITAL ENCOUNTER (OUTPATIENT)
Dept: RADIOLOGY | Facility: HOSPITAL | Age: 64
Discharge: HOME | End: 2024-07-05
Attending: INTERNAL MEDICINE
Payer: MEDICARE

## 2024-07-05 DIAGNOSIS — Z12.31 ENCOUNTER FOR SCREENING MAMMOGRAM FOR MALIGNANT NEOPLASM OF BREAST: ICD-10-CM

## 2024-07-05 PROCEDURE — 77067 SCR MAMMO BI INCL CAD: CPT | Mod: 26,,, | Performed by: RADIOLOGY

## 2024-07-05 PROCEDURE — 77067 SCR MAMMO BI INCL CAD: CPT | Mod: TC

## 2024-07-05 PROCEDURE — 77063 BREAST TOMOSYNTHESIS BI: CPT | Mod: 26,,, | Performed by: RADIOLOGY

## 2024-07-05 PROCEDURE — 77063 BREAST TOMOSYNTHESIS BI: CPT | Mod: TC

## 2025-02-22 DIAGNOSIS — Z00.00 ENCOUNTER FOR MEDICARE ANNUAL WELLNESS EXAM: ICD-10-CM

## 2025-07-21 ENCOUNTER — TELEPHONE (OUTPATIENT)
Dept: FAMILY MEDICINE | Facility: CLINIC | Age: 65
End: 2025-07-21
Payer: MEDICARE

## 2025-07-21 DIAGNOSIS — Z12.31 ENCOUNTER FOR SCREENING MAMMOGRAM FOR MALIGNANT NEOPLASM OF BREAST: Primary | ICD-10-CM

## 2025-07-21 NOTE — TELEPHONE ENCOUNTER
Copied from CRM #[id number]. Topic: General Inquiry - Patient Advice  >> Jul 21, 2025  2:38 PM Vy wrote:  Type:  Patient Requesting Referral    Who Called:gracie    Referral to What Specialty:mammo    Reason for Referral:one yr appt    Does the patient want the referral with a specific physician?:no    [facility] the [facility]    Would the patient rather a call back or a response via [facility]? Callback     Best Call Back Number:[phone]    Additional Information:

## 2025-07-29 ENCOUNTER — HOSPITAL ENCOUNTER (OUTPATIENT)
Dept: RADIOLOGY | Facility: HOSPITAL | Age: 65
Discharge: HOME | End: 2025-07-29
Attending: INTERNAL MEDICINE
Payer: MEDICARE

## 2025-07-29 VITALS — HEIGHT: 66 IN | WEIGHT: 184 LBS | BODY MASS INDEX: 29.57 KG/M2

## 2025-07-29 DIAGNOSIS — Z12.31 ENCOUNTER FOR SCREENING MAMMOGRAM FOR MALIGNANT NEOPLASM OF BREAST: ICD-10-CM

## 2025-07-29 PROCEDURE — 77063 BREAST TOMOSYNTHESIS BI: CPT | Mod: 26,,, | Performed by: RADIOLOGY

## 2025-07-29 PROCEDURE — 77067 SCR MAMMO BI INCL CAD: CPT | Mod: 26,,, | Performed by: RADIOLOGY

## 2025-07-29 PROCEDURE — 77067 SCR MAMMO BI INCL CAD: CPT | Mod: TC

## 2025-08-21 ENCOUNTER — OFFICE VISIT (OUTPATIENT)
Dept: FAMILY MEDICINE | Facility: CLINIC | Age: 65
End: 2025-08-21
Payer: MEDICARE

## 2025-08-21 VITALS
WEIGHT: 179.44 LBS | DIASTOLIC BLOOD PRESSURE: 86 MMHG | OXYGEN SATURATION: 96 % | HEIGHT: 66 IN | RESPIRATION RATE: 20 BRPM | TEMPERATURE: 97 F | BODY MASS INDEX: 28.84 KG/M2 | SYSTOLIC BLOOD PRESSURE: 138 MMHG | HEART RATE: 75 BPM

## 2025-08-21 DIAGNOSIS — G40.909 SEIZURE DISORDER: ICD-10-CM

## 2025-08-21 DIAGNOSIS — F79 INTELLECTUAL DISABILITY: Primary | ICD-10-CM

## 2025-08-21 PROCEDURE — 99213 OFFICE O/P EST LOW 20 MIN: CPT | Mod: S$PBB,,,

## 2025-08-21 PROCEDURE — 99999 PR PBB SHADOW E&M-EST. PATIENT-LVL III: CPT | Mod: PBBFAC,,,

## 2025-08-21 PROCEDURE — 99213 OFFICE O/P EST LOW 20 MIN: CPT | Mod: PBBFAC,PN
